# Patient Record
Sex: FEMALE | Race: OTHER | Employment: PART TIME | URBAN - METROPOLITAN AREA
[De-identification: names, ages, dates, MRNs, and addresses within clinical notes are randomized per-mention and may not be internally consistent; named-entity substitution may affect disease eponyms.]

---

## 2017-02-01 ENCOUNTER — APPOINTMENT (OUTPATIENT)
Dept: GENERAL RADIOLOGY | Age: 37
End: 2017-02-01
Attending: NURSE PRACTITIONER

## 2017-02-01 ENCOUNTER — HOSPITAL ENCOUNTER (EMERGENCY)
Age: 37
Discharge: HOME | End: 2017-02-01
Attending: FAMILY MEDICINE

## 2017-02-01 VITALS
RESPIRATION RATE: 16 BRPM | DIASTOLIC BLOOD PRESSURE: 68 MMHG | HEIGHT: 60 IN | OXYGEN SATURATION: 98 % | BODY MASS INDEX: 37.63 KG/M2 | TEMPERATURE: 99 F | WEIGHT: 191.7 LBS | HEART RATE: 99 BPM | SYSTOLIC BLOOD PRESSURE: 103 MMHG

## 2017-02-01 DIAGNOSIS — S39.012A LUMBAR STRAIN, INITIAL ENCOUNTER: Primary | ICD-10-CM

## 2017-02-01 DIAGNOSIS — R07.81 RIB PAIN ON RIGHT SIDE: ICD-10-CM
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
## 2017-02-01 LAB
BILIRUB UR QL: NEGATIVE
GLUCOSE UR QL STRIP.AUTO: NEGATIVE MG/DL
KETONES UR-MCNC: NEGATIVE MG/DL
LEUKOCYTE ESTERASE UR QL STRIP: NEGATIVE
NITRITE UR QL: NEGATIVE
PH UR: 6 [PH] (ref 5–8)
PROT UR QL: NEGATIVE MG/DL
RBC # UR STRIP: ABNORMAL /UL
SP GR UR: 1.01 (ref 1–1.03)
UROBILINOGEN UR QL: 0.2 EU/DL (ref 0.2–1)

## 2017-02-01 RX ORDER — DICLOFENAC POTASSIUM 50 MG/1
50 TABLET, FILM COATED ORAL 3 TIMES DAILY
Qty: 50 TAB | Refills: 0 | Status: SHIPPED | OUTPATIENT
Start: 2017-02-01 | End: 2017-04-18

## 2017-02-01 RX ORDER — METHOCARBAMOL 500 MG/1
500 TABLET, FILM COATED ORAL 3 TIMES DAILY
Qty: 20 TAB | Refills: 0 | Status: SHIPPED | OUTPATIENT
Start: 2017-02-01 | End: 2017-04-18

## 2017-02-01 RX ORDER — KETOROLAC TROMETHAMINE 30 MG/ML
60 INJECTION, SOLUTION INTRAMUSCULAR; INTRAVENOUS
Status: COMPLETED | OUTPATIENT
Start: 2017-02-01 | End: 2017-02-01

## 2017-02-01 RX ADMIN — KETOROLAC TROMETHAMINE 60 MG: 30 INJECTION, SOLUTION INTRAMUSCULAR; INTRAVENOUS at 20:05

## 2017-02-02 NOTE — DISCHARGE INSTRUCTIONS
Preventing Falls: Care Instructions  Your Care Instructions  Getting around your home safely can be a challenge if you have injuries or health problems that make it easy for you to fall. Loose rugs and furniture in walkways are among the dangers for many older people who have problems walking or who have poor eyesight. People who have conditions such as arthritis, osteoporosis, or dementia also have to be careful not to fall. You can make your home safer with a few simple measures. Follow-up care is a key part of your treatment and safety. Be sure to make and go to all appointments, and call your doctor if you are having problems. It's also a good idea to know your test results and keep a list of the medicines you take. How can you care for yourself at home? Taking care of yourself  · You may get dizzy if you do not drink enough water. To prevent dehydration, drink plenty of fluids, enough so that your urine is light yellow or clear like water. Choose water and other caffeine-free clear liquids. If you have kidney, heart, or liver disease and have to limit fluids, talk with your doctor before you increase the amount of fluids you drink. · Exercise regularly to improve your strength, muscle tone, and balance. Walk if you can. Swimming may be a good choice if you cannot walk easily. · Have your vision and hearing checked each year or any time you notice a change. If you have trouble seeing and hearing, you might not be able to avoid objects and could lose your balance. · Know the side effects of the medicines you take. Ask your doctor or pharmacist whether the medicines you take can affect your balance. Sleeping pills or sedatives can affect your balance. · Limit the amount of alcohol you drink. Alcohol can impair your balance and other senses. · Ask your doctor whether calluses or corns on your feet need to be removed.  If you wear loose-fitting shoes because of calluses or corns, you can lose your balance and fall. · Talk to your doctor if you have numbness in your feet. Preventing falls at home  · Remove raised doorway thresholds, throw rugs, and clutter. Repair loose carpet or raised areas in the floor. · Move furniture and electrical cords to keep them out of walking paths. · Use nonskid floor wax, and wipe up spills right away, especially on ceramic tile floors. · If you use a walker or cane, put rubber tips on it. If you use crutches, clean the bottoms of them regularly with an abrasive pad, such as steel wool. · Keep your house well lit, especially [employer], and outside walkways. Use night-lights in areas such as hallways and bathrooms. Add extra light switches or use remote switches (such as switches that go on or off when you clap your hands) to make it easier to turn lights on if you have to get up during the night. · Install sturdy handrails on stairways. · Move items in your cabinets so that the things you use a lot are on the lower shelves (about waist level). · Keep a cordless phone and a flashlight with new batteries by your bed. If possible, put a phone in each of the main rooms of your house, or carry a cell phone in case you fall and cannot reach a phone. Or, you can wear a device around your neck or wrist. You push a button that sends a signal for help. · Wear low-heeled shoes that fit well and give your feet good support. Use footwear with nonskid soles. Check the heels and soles of your shoes for wear. Repair or replace worn heels or soles. · Do not wear socks without shoes on wood floors. · Walk on the grass when the sidewalks are slippery. If you live in an area that gets snow and ice in the winter, sprinkle salt on slippery steps and sidewalks. Preventing falls in the bath  · Install grab bars and nonskid mats inside and outside your shower or tub and near the toilet and sinks. · Use shower chairs and bath benches.   · Use a hand-held shower head that will allow you to sit while showering. · Get into a tub or shower by putting the weaker leg in first. Get out of a tub or shower with your strong side first.  · Repair loose toilet seats and consider installing a raised toilet seat to make getting on and off the toilet easier. · Keep your bathroom door unlocked while you are in the shower. Where can you learn more? Go to http://mansoor-eugene.info/. Enter 0476 79 69 [id number] in the search box to learn more about \"Preventing Falls: Care Instructions. \"  Current as of: August 4, 2016  Content Version: 11.1  © 2999-4627 [employer]. Care instructions adapted under license by [employer] (which disclaims liability or warranty for this information). If you have questions about a medical condition or this instruction, always ask your healthcare professional. [place] [address] any warranty or liability for your use of this information. Back Pain: Care Instructions  Your Care Instructions    Back pain has many possible causes. It is often related to problems with muscles and ligaments of the back. It may also be related to problems with the nerves, discs, or bones of the back. Moving, lifting, standing, sitting, or sleeping in an awkward way can strain the back. Sometimes you don't notice the injury until later. Arthritis is another common cause of back pain. Although it may hurt a lot, back pain usually improves on its own within several weeks. Most people recover in 12 weeks or less. Using good home treatment and being careful not to stress your back can help you feel better sooner. Follow-up care is a key part of your treatment and safety. Be sure to make and go to all appointments, and call your doctor if you are having problems. Its also a good idea to know your test results and keep a list of the medicines you take. How can you care for yourself at home?   · Sit or lie in positions that are most comfortable and reduce your pain. Try one of these positions when you lie down:  ¨ Lie on your back with your knees bent and supported by large pillows. ¨ Lie on the floor with your legs on the seat of a sofa or chair. Deng Maidens on your side with your knees and hips bent and a pillow between your legs. ¨ Lie on your stomach if it does not make pain worse. · Do not sit up in bed, and avoid soft couches and twisted positions. Bed rest can help relieve pain at first, but it delays healing. Avoid bed rest after the first day of back pain. · Change positions every 30 minutes. If you must sit for long periods of time, take breaks from sitting. Get up and walk around, or lie in a comfortable position. · Try using a heating pad on a low or medium setting for 15 to 20 minutes every 2 or 3 hours. Try a warm shower in place of one session with the heating pad. · You can also try an ice pack for 10 to 15 minutes every 2 to 3 hours. Put a thin cloth between the ice pack and your skin. · Take pain medicines exactly as directed. ¨ If the doctor gave you a prescription medicine for pain, take it as prescribed. ¨ If you are not taking a prescription pain medicine, ask your doctor if you can take an over-the-counter medicine. · Take short walks several times a day. You can start with 5 to 10 minutes, 3 or 4 times a day, and work up to longer walks. Walk on level surfaces and avoid hills and stairs until your back is better. · Return to work and other activities as soon as you can. Continued rest without activity is usually not good for your back. · To prevent future back pain, do exercises to stretch and strengthen your back and stomach. Learn how to use good posture, safe lifting techniques, and proper body mechanics. When should you call for help? Call your doctor now or seek immediate medical care if:  · You have new or worsening numbness in your legs. · You have new or worsening weakness in your legs.  (This could make it hard to stand up. )  · You lose control of your bladder or bowels. Watch closely for changes in your health, and be sure to contact your doctor if:  · Your pain gets worse. · You are not getting better after 2 weeks. Where can you learn more? Go to http://mansoor-eugene.info/. Enter N809 in the search box to learn more about \"Back Pain: Care Instructions. \"  Current as of: May 23, 2016  Content Version: 11.1  © 5499-8773 [employer]. Care instructions adapted under license by [employer] (which disclaims liability or warranty for this information). If you have questions about a medical condition or this instruction, always ask your healthcare professional. [address] any warranty or liability for your use of this information.

## 2017-02-02 NOTE — UC PROVIDER NOTE
HPI Comments: 41 yo female presents today with C/O low back and right rib pain after sustaining a fall 2 days ago on stairs. Patient is ambulatory with slow gait. Patient is a 40 y.o. female presenting with rib pain. The history is provided by the patient. No  was used. Rib Pain   This is a new problem. The current episode started 2 days ago. Pertinent negatives include no fever, no headaches, no syncope, no abdominal pain, no leg pain and no leg swelling. Past Medical History   Diagnosis Date    Chronic pain      low back    Uterine fibroid         Past Surgical History   Procedure Laterality Date    Hx other surgical       WISDOM TEETH EXTRACTION    Hx tubal ligation      Hx  section      Pr myomectomy 1-4,w/tot 250gms/<,abd apprc  2014     fibroid removal by Dr. Angela Valerio    Hx  section           Family History   Problem Relation Age of Onset    Diabetes Mother     Asthma Mother     Elevated Lipids Mother     Liver Disease Father      CHIRROSIS    Alcohol abuse Father     No Known Problems Son     No Known Problems Son     Other Sister      PEPTIC ULCER    Diabetes Maternal Aunt     Diabetes Maternal Grandmother     Other Other      NO ANESTHESIA PROBLEMS IN FAMILY    Anesth Problems Neg Hx         Social History     Social History    Marital status:      Spouse name: N/A    Number of children: N/A    Years of education: N/A     Occupational History    Not on file.      Social History Main Topics    Smoking status: Current Every Day Smoker     Packs/day: 0.25     Years: 15.00     Types: Cigarettes    Smokeless tobacco: Current User      Comment: GIVEN \"STOP SMOKING\" HANDOUT.    Alcohol use 0.0 oz/week     0 Standard drinks or equivalent per week      Comment: OCCASIONAL    Drug use: No    Sexual activity: Yes     Partners: Male     Birth control/ protection: Surgical      Comment:  has 2 young children     Other Topics Concern    Not on file     Social History Narrative                ALLERGIES: Pcn [penicillins]; Pork/porcine containing products; and Shellfish containing products    Review of Systems   Constitutional: Negative. Negative for fever. HENT: Negative. Eyes: Negative. Respiratory: Negative. Cardiovascular: Negative. Negative for leg swelling and syncope. Gastrointestinal: Negative for abdominal pain. Endocrine: Negative. Genitourinary: Negative. Musculoskeletal: Positive for back pain. Skin: Negative. Allergic/Immunologic: Negative. Neurological: Negative for headaches. Hematological: Negative. Psychiatric/Behavioral: Negative. Vitals:    02/01/17 1851   BP: 103/68   Pulse: 99   Resp: 16   Temp: 99 °F (37.2 °C)   SpO2: 98%   Weight: 87 kg (191 lb 11.2 oz)   Height: 5' (1.524 m)       Physical Exam   Constitutional: She is oriented to person, place, and time. She appears well-developed and well-nourished. HENT:   Head: Normocephalic and atraumatic. Right Ear: External ear normal.   Left Ear: External ear normal.   Nose: Nose normal.   Mouth/Throat: Oropharynx is clear and moist.   Eyes: Conjunctivae and EOM are normal. Pupils are equal, round, and reactive to light. Neck: Normal range of motion. Neck supple. Cardiovascular: Normal rate, regular rhythm and normal heart sounds. Pulmonary/Chest: Effort normal and breath sounds normal. No respiratory distress. She exhibits no tenderness. Abdominal: Soft. Musculoskeletal: Normal range of motion. + paraspinal lumbar tenderness  No contusions or deformities  ROM is difficult at times     Neurological: She is alert and oriented to person, place, and time. Skin: Skin is warm and dry. Psychiatric: She has a normal mood and affect. Her behavior is normal. Judgment and thought content normal.   Nursing note and vitals reviewed.       MDM     Differential Diagnosis; Clinical Impression; Plan:     CLINICAL IMPRESSION:  Lumbar strain, initial encounter  (primary encounter diagnosis)  Rib pain on right side  Fall, initial encounter    Plan:  1. There are no fractures noted on xray today  2. You are going to be sore  3. Follow up with your PCP as needed  Amount and/or Complexity of Data Reviewed:   Clinical lab tests:  Ordered and reviewed  Tests in the radiology section of CPT®:  Ordered and reviewed  Risk of Significant Complications, Morbidity, and/or Mortality:   Presenting problems: Moderate  Diagnostic procedures:   Moderate  Progress:   Patient progress:  Stable      Procedures

## 2017-03-02 ENCOUNTER — OFFICE VISIT (OUTPATIENT)
Dept: INTERNAL MEDICINE CLINIC | Age: 37
End: 2017-03-02

## 2017-03-02 VITALS
DIASTOLIC BLOOD PRESSURE: 66 MMHG | OXYGEN SATURATION: 98 % | HEIGHT: 60 IN | WEIGHT: 185 LBS | SYSTOLIC BLOOD PRESSURE: 105 MMHG | TEMPERATURE: 98.5 F | RESPIRATION RATE: 18 BRPM | HEART RATE: 92 BPM | BODY MASS INDEX: 36.32 KG/M2

## 2017-03-02 DIAGNOSIS — R35.0 URINE FREQUENCY: ICD-10-CM

## 2017-03-02 DIAGNOSIS — R10.9 RIGHT FLANK PAIN: Primary | ICD-10-CM

## 2017-03-02 LAB
BILIRUB UR QL STRIP: NEGATIVE
GLUCOSE UR-MCNC: NEGATIVE MG/DL
KETONES P FAST UR STRIP-MCNC: NEGATIVE MG/DL
PH UR STRIP: 6 [PH] (ref 4.6–8)
PROT UR QL STRIP: ABNORMAL MG/DL
SP GR UR STRIP: 1.2 (ref 1–1.03)
UA UROBILINOGEN AMB POC: ABNORMAL (ref 0.2–1)
URINALYSIS CLARITY POC: CLEAR
URINALYSIS COLOR POC: YELLOW
URINE BLOOD POC: NEGATIVE
URINE LEUKOCYTES POC: NEGATIVE
URINE NITRITES POC: NEGATIVE

## 2017-03-02 RX ORDER — PHENAZOPYRIDINE HYDROCHLORIDE 100 MG/1
100 TABLET, FILM COATED ORAL
Qty: 9 TAB | Refills: 0 | Status: SHIPPED | OUTPATIENT
Start: 2017-03-02 | End: 2017-03-05

## 2017-03-02 RX ORDER — ZOLPIDEM TARTRATE 5 MG/1
TABLET ORAL
Refills: 5 | COMMUNITY
Start: 2017-02-18 | End: 2017-04-18

## 2017-03-02 RX ORDER — IBUPROFEN 800 MG/1
TABLET ORAL
Refills: 5 | COMMUNITY
Start: 2017-01-05 | End: 2017-04-18

## 2017-03-02 NOTE — PROGRESS NOTES
Bethany Tracy is a 40 y.o. female who presents for evaluation of urine frequency and right flank pain. Injured self early in [de-identified], went to  on , had normal xray with no fx.  ua had small blood. Still with some right flank pain and some frequency. ROS:  Constitutional: negative for fevers, chills, anorexia and weight loss  Eyes:   negative for visual disturbance and irritation  ENT:   negative for tinnitus,sore throat,nasal congestion,ear pain,hoarseness  Respiratory:  negative for cough, hemoptysis, dyspnea,wheezing  CV:   negative for chest pain, palpitations, lower extremity edema  GI:   negative for nausea, vomiting, diarrhea, abdominal pain,melena  Genitourinary: negative for dysuria and hematuria. ++frequency  Musculoskel: negative for myalgias, arthralgias, back pain, muscle weakness, joint pain  Neurological:  negative for headaches, dizziness, focal weakness, numbness  Psychiatric:     Negative for depression or anxiety      Past Medical History:   Diagnosis Date    Chronic pain     low back    Uterine fibroid        Past Surgical History:   Procedure Laterality Date    HX  SECTION      HX  SECTION      HX OTHER SURGICAL      WISDOM TEETH EXTRACTION    HX TUBAL LIGATION      MYOMECTOMY 1-4,W/TOT 250GMS/<, [address]  2014    fibroid removal by Dr. Marbella Desouza       Family History   Problem Relation Age of Onset    Diabetes Mother     Asthma Mother     Elevated Lipids Mother     Liver Disease Father      CHIRROSIS    Alcohol abuse Father     No Known Problems Son     No Known Problems Son     Other Sister      PEPTIC ULCER    Diabetes Maternal Aunt     Diabetes Maternal Grandmother     Other Other      NO ANESTHESIA PROBLEMS IN FAMILY    Anesth Problems Neg Hx        Social History     Social History    Marital status:      Spouse name: N/A    Number of children: N/A    Years of education: N/A     Occupational History    Not on file. Social History Main Topics    Smoking status: Former Smoker     Packs/day: 0.25     Years: 15.00     Types: Cigarettes     Quit date: 1/3/2017    Smokeless tobacco: Current User      Comment: GIVEN \"STOP SMOKING\" HANDOUT.    Alcohol use No    Drug use: No    Sexual activity: Yes     Partners: Male     Birth control/ protection: Surgical      Comment:  has 2 young children     Other Topics Concern    Not on file     Social History Narrative            Visit Vitals    /66 (BP 1 Location: Right arm, BP Patient Position: Sitting)    Pulse 92    Temp 98.5 °F (36.9 °C) (Oral)    Resp 18    Ht 5' (1.524 m)    Wt 185 lb (83.9 kg)    LMP 08/08/2016    SpO2 98%    BMI 36.13 kg/m2       Physical Examination:   General - Well appearing female  HEENT - PERRL, TM no erythema/opacification, normal nasal turbinates, no oropharyngeal erythema or exudate, MMM  Neck - supple, no bruits, no thyroidomegaly, no lymphadenopathy  Pulm - clear to auscultation bilaterally  Cardio - RRR, normal S1 S2, no murmur  Abd - soft, nontender, no masses, no HSM  Extrem - no edema, +2 distal pulses  Neuro-  No focal deficits, CN intact     Assessment/Plan:    1. Urine frequency--ua clear. rx given for pyridium  2. Right flank pain--supportive care, add ice, nsaids bid with food prn. Xray was normal, as is exam.    rtc prn.   Sees dr Goyo Mcallister,

## 2017-03-02 NOTE — MR AVS SNAPSHOT
Visit Information Date & Time Provider Department Dept. Phone Encounter #  
 3/2/2017 11:45 AM Mitch Ibarra, [address] [id number] Follow-up Instructions Return if symptoms worsen or fail to improve. Upcoming Health Maintenance Date Due  
 PAP AKA CERVICAL CYTOLOGY 1/18/2001 DTaP/Tdap/Td series (2 - Td) 3/2/2027 Allergies as of 3/2/2017  Review Complete On: 3/2/2017 By: Mehdi Yates III, DO Severity Noted Reaction Type Reactions Pcn [Penicillins]  06/24/2012    Rash Pork/porcine Containing Products  01/20/2015    Rash Shellfish Containing Products  01/20/2015    Rash Current Immunizations  Never Reviewed No immunizations on file. Not reviewed this visit You Were Diagnosed With   
  
 Codes Comments Right flank pain    -  Primary ICD-10-CM: R10.9 ICD-9-CM: 789.09 Urine frequency     ICD-10-CM: R35.0 ICD-9-CM: 788.41 Vitals BP  
  
  
  
  
  
 105/66 (BP 1 Location: Right arm, BP Patient Position: Sitting) Vitals History BMI and BSA Data Body Mass Index Body Surface Area  
 36.13 kg/m 2 1.88 m 2 Preferred Pharmacy Pharmacy Name Phone [id number] [address] [phone] Your Updated Medication List  
  
   
This list is accurate as of: 3/2/17 12:48 PM.  Always use your most recent med list.  
  
  
  
  
 diclofenac potassium 50 mg tablet Commonly known as:  CATAFLAM  
Take 1 Tab by mouth three (3) times daily. Indications: Pain * ibuprofen 600 mg tablet Commonly known as:  MOTRIN Take 1 Tab by mouth every six (6) hours as needed. * ibuprofen 800 mg tablet Commonly known as:  MOTRIN  
TAKE 1 TABLET BY MOUTH 3 TIMES A DAY  
  
 methocarbamol 500 mg tablet Commonly known as:  ROBAXIN Take 1 Tab by mouth three (3) times daily. phenazopyridine 100 mg tablet Commonly known as:  PYRIDIUM Take 1 Tab by mouth three (3) times daily as needed for Pain for up to 3 days. zolpidem 5 mg tablet Commonly known as:  AMBIEN  
TAKE 1 TABLET BY MOUTH EVERY NIGHT AT BEDTIME AS NEEDED * Notice: This list has 2 medication(s) that are the same as other medications prescribed for you. Read the directions carefully, and ask your doctor or other care provider to review them with you. Prescriptions Sent to Pharmacy Refills  
 phenazopyridine (PYRIDIUM) 100 mg tablet 0 Sig: Take 1 Tab by mouth three (3) times daily as needed for Pain for up to 3 days. Class: Normal  
 Pharmacy: [facility] #[id number] #: [phone] Route: Oral  
  
Follow-up Instructions Return if symptoms worsen or fail to improve. Patient Instructions Frequent Urination: Care Instructions Your Care Instructions An urge to urinate frequently but usually passing only small amounts of urine is a common symptom of urinary problems, such as urinary tract infections. The bladder may become inflamed. This can cause the urge to urinate. You may try to urinate more often than usual to try to soothe that urge. Frequent urination also may be caused by sexually transmitted infections (STIs) or kidney stones. Or it may happen when something irritates the tube that carries urine from the bladder to the outside of the body (urethra). It may also be a sign of diabetes. The cause may be hard to find. You may need tests. Follow-up care is a key part of your treatment and safety. Be sure to make and go to all appointments, and call your doctor if you are having problems. It's also a good idea to know your test results and keep a list of the medicines you take. How can you care for yourself at home?  
· Drink extra water and juices such as cranberry and blueberry juices for the next day or two. This will help make the urine less concentrated. And it may help wash out any bacteria that may be causing an infection. (If you have kidney, heart, or liver disease and have to limit fluids, talk with your doctor before you increase the amount of fluids you drink.) · Avoid drinks that are carbonated or have caffeine. They can irritate the bladder. For women: · Urinate right after you have sex. · After you go to the bathroom, wipe from front to back. · Avoid douches, bubble baths, and feminine hygiene sprays. And avoid other feminine hygiene products that have deodorants. When should you call for help? Call your doctor now or seek immediate medical care if: 
· You have new symptoms, such as fever, nausea, or vomiting. · You have new or worse symptoms of a urinary problem. For example: ¨ You have blood or pus in your urine. ¨ You have chills or body aches. ¨ It hurts to urinate. ¨ You have groin or belly pain. ¨ You have pain in your back just below your rib cage (the flank area). Watch closely for changes in your health, and be sure to contact your doctor if you feel thirstier than usual. 
Where can you learn more? Go to http://mansoor-eugene.info/. Enter 101 [id number] in the search box to learn more about \"Frequent Urination: Care Instructions. \" Current as of: August 12, 2016 Content Version: 11.1 © 2347-2413 [employer]. Care instructions adapted under license by "[employer] (which disclaims liability or warranty for this information). If you have questions about a medical condition or this instruction, always ask your healthcare professional. [address] any warranty or liability for your use of this information. Introducing [facility]! Dear Felicia Cons: 
Thank you for requesting a TxtFeedback account. Our records indicate that you already have an active TxtFeedback account.   You can access your account anytime at [email or web address] Did you know that you can access your hospital and ER discharge instructions at any time in Swoodoo? You can also review all of your test results from your hospital stay or ER visit. Additional Information If you have questions, please visit the Frequently Asked Questions section of the Swoodoo website at [email or web address] Remember, Swoodoo is NOT to be used for urgent needs. For medical emergencies, dial 911. Now available from your iPhone and Android! Please provide this summary of care documentation to your next provider. Your primary care clinician is listed as Jeremias Noble. If you have any questions after today's visit, please call [phone].

## 2017-03-02 NOTE — PROGRESS NOTES
Reviewed record in preparation for visit and have obtained necessary documentation. Identified pt with two pt identifiers(name and ). Chief Complaint   Patient presents with    Flank Pain     right       Health Maintenance Due   Topic Date Due    Pneumococcal 19-64 Medium Risk (1 of 1 - PPSV23) 1999    DTaP/Tdap/Td series (1 - Tdap) 2001    PAP AKA CERVICAL CYTOLOGY  2001    INFLUENZA AGE 9 TO ADULT  2016       Ms. Jem Gill has a reminder for a \"due or due soon\" health maintenance. I have asked that she discuss health maintenance topic(s) due with Her  primary care provider. Coordination of Care Questionnaire:  :     1) Have you been to an emergency room, urgent care clinic since your last visit? yes   Hospitalized since your last visit? no             2) Have you seen or consulted any other health care providers outside of [address] [place] [address] since your last visit? no  (Include any pap smears or colon screenings in this section.)      Patient is accompanied by self I have received verbal consent from Bethany Tracy to discuss any/all medical information while they are present in the room.

## 2017-03-02 NOTE — PATIENT INSTRUCTIONS
Frequent Urination: Care Instructions  Your Care Instructions  An urge to urinate frequently but usually passing only small amounts of urine is a common symptom of urinary problems, such as urinary tract infections. The bladder may become inflamed. This can cause the urge to urinate. You may try to urinate more often than usual to try to soothe that urge. Frequent urination also may be caused by sexually transmitted infections (STIs) or kidney stones. Or it may happen when something irritates the tube that carries urine from the bladder to the outside of the body (urethra). It may also be a sign of diabetes. The cause may be hard to find. You may need tests. Follow-up care is a key part of your treatment and safety. Be sure to make and go to all appointments, and call your doctor if you are having problems. It's also a good idea to know your test results and keep a list of the medicines you take. How can you care for yourself at home? · Drink extra water and juices such as cranberry and blueberry juices for the next day or two. This will help make the urine less concentrated. And it may help wash out any bacteria that may be causing an infection. (If you have kidney, heart, or liver disease and have to limit fluids, talk with your doctor before you increase the amount of fluids you drink.)  · Avoid drinks that are carbonated or have caffeine. They can irritate the bladder. For women:  · Urinate right after you have sex. · After you go to the bathroom, wipe from front to back. · Avoid douches, bubble baths, and feminine hygiene sprays. And avoid other feminine hygiene products that have deodorants. When should you call for help? Call your doctor now or seek immediate medical care if:  · You have new symptoms, such as fever, nausea, or vomiting. · You have new or worse symptoms of a urinary problem. For example:  ¨ You have blood or pus in your urine. ¨ You have chills or body aches.   ¨ It hurts to urinate. ¨ You have groin or belly pain. ¨ You have pain in your back just below your rib cage (the flank area). Watch closely for changes in your health, and be sure to contact your doctor if you feel thirstier than usual.  Where can you learn more? Go to http://mansoor-eugene.info/. Enter 644 [id number] in the search box to learn more about \"Frequent Urination: Care Instructions. \"  Current as of: August 12, 2016  Content Version: 11.1  © 9087-8797 [employer]. Care instructions adapted under license by [employer] (which disclaims liability or warranty for this information). If you have questions about a medical condition or this instruction, always ask your healthcare professional. [address] any warranty or liability for your use of this information.

## 2017-03-06 ENCOUNTER — TELEPHONE (OUTPATIENT)
Dept: INTERNAL MEDICINE CLINIC | Age: 37
End: 2017-03-06

## 2017-03-06 RX ORDER — TRAMADOL HYDROCHLORIDE 50 MG/1
50 TABLET ORAL
Qty: 20 TAB | Refills: 0 | Status: SHIPPED | OUTPATIENT
Start: 2017-03-06 | End: 2017-04-18

## 2017-03-06 NOTE — TELEPHONE ENCOUNTER
Dr. Cole Carlson patient last seen by Dr. Tere Hernandez on 3/2/17 states she needs a call back in reference to getting pain medication discussed at [facility] that she declined at [facility] but patient states she's still in pain. Please call to discuss.  Thank you

## 2017-03-06 NOTE — TELEPHONE ENCOUNTER
I called and spoke with patient. Pt states that Dr Minerva Meza offered to give Tramadol at her appointment but she declined because it makes her tired. She states that she has been taking Aleve which is not helping. Pt is requesting Tramadol.

## 2017-04-18 ENCOUNTER — OFFICE VISIT (OUTPATIENT)
Dept: INTERNAL MEDICINE CLINIC | Age: 37
End: 2017-04-18

## 2017-04-18 VITALS
BODY MASS INDEX: 36.52 KG/M2 | OXYGEN SATURATION: 96 % | SYSTOLIC BLOOD PRESSURE: 94 MMHG | DIASTOLIC BLOOD PRESSURE: 69 MMHG | HEIGHT: 60 IN | HEART RATE: 96 BPM | RESPIRATION RATE: 16 BRPM | TEMPERATURE: 98 F | WEIGHT: 186 LBS

## 2017-04-18 DIAGNOSIS — F41.9 ANXIETY: Primary | ICD-10-CM

## 2017-04-18 DIAGNOSIS — G89.29 CHRONIC LOW BACK PAIN WITHOUT SCIATICA, UNSPECIFIED BACK PAIN LATERALITY: ICD-10-CM

## 2017-04-18 DIAGNOSIS — M54.50 CHRONIC LOW BACK PAIN WITHOUT SCIATICA, UNSPECIFIED BACK PAIN LATERALITY: ICD-10-CM

## 2017-04-18 RX ORDER — TRAMADOL HYDROCHLORIDE 50 MG/1
50 TABLET ORAL
Qty: 30 TAB | Refills: 1 | Status: SHIPPED | OUTPATIENT
Start: 2017-04-18 | End: 2017-04-28

## 2017-04-18 NOTE — PROGRESS NOTES
Reviewed record in preparation for visit and have obtained necessary documentation. Identified pt with two pt identifiers(name and ). Chief Complaint   Patient presents with    Depression     pt is here for a f/u meds taken pt states works for her but  she is unable to sleep    Back Pain     pt still having back pain (Tramadol works better per pt)    Medication Refill     pt will like another script for Ambien and Tramadol        Health Maintenance Due   Topic Date Due    PAP AKA CERVICAL CYTOLOGY  2001       Coordination of Care Questionnaire:  :     1. Have you been to the ER, urgent care clinic since your last visit? Hospitalized since your last visit?no    2. Have you seen or consulted any other health care providers outside of the [facility] since your last visit? Include any pap smears or colon screening.  no

## 2017-04-18 NOTE — MR AVS SNAPSHOT
Visit Information Date & Time Provider Department Dept. Phone Encounter #  
 4/18/2017 11:30 AM Frankey Level, [address] [id number] Follow-up Instructions Return if symptoms worsen or fail to improve. Upcoming Health Maintenance Date Due  
 PAP AKA CERVICAL CYTOLOGY 1/18/2001 DTaP/Tdap/Td series (2 - Td) 3/2/2027 Allergies as of 4/18/2017  Review Complete On: 4/18/2017 By: Frankey Level, MD  
  
 Severity Noted Reaction Type Reactions Pcn [Penicillins]  06/24/2012    Rash Pork/porcine Containing Products  01/20/2015    Rash Shellfish Containing Products  01/20/2015    Rash Current Immunizations  Never Reviewed No immunizations on file. Not reviewed this visit You Were Diagnosed With   
  
 Codes Comments Anxiety    -  Primary ICD-10-CM: F41.9 ICD-9-CM: 300.00 Chronic low back pain without sciatica, unspecified back pain laterality     ICD-10-CM: M54.5, G89.29 ICD-9-CM: 724.2, 338.29 Vitals BP Pulse Temp Resp Height(growth percentile) Weight(growth percentile) 94/69 96 98 °F (36.7 °C) (Oral) 16 5' (1.524 m) 186 lb (84.4 kg) LMP SpO2 BMI OB Status Smoking Status 08/08/2016 96% 36.33 kg/m2 Hysterectomy Former Smoker BMI and BSA Data Body Mass Index Body Surface Area  
 36.33 kg/m 2 1.89 m 2 Preferred Pharmacy Pharmacy Name Phone [id number], [address] [place] [address] [phone] Your Updated Medication List  
  
   
This list is accurate as of: 4/18/17 12:40 PM.  Always use your most recent med list.  
  
  
  
  
 traMADol 50 mg tablet Commonly known as:  ULTRAM  
Take 1 Tab by mouth every eight (8) hours as needed for Pain for up to 10 days. Max Daily Amount: 150 mg.  
  
  
  
  
Prescriptions Printed  Refills  
 traMADol (ULTRAM) 50 mg tablet 1  
 Sig: Take 1 Tab by mouth every eight (8) hours as needed for Pain for up to 10 days. Max Daily Amount: 150 mg.  
 Class: Print Route: Oral  
  
Follow-up Instructions Return if symptoms worsen or fail to improve. Introducing [facility]! Dear Hussain Dennis: 
Thank you for requesting a Herzio account. Our records indicate that you already have an active Herzio account. You can access your account anytime at [email or web address] Did you know that you can access your hospital and ER discharge instructions at any time in Herzio? You can also review all of your test results from your hospital stay or ER visit. Additional Information If you have questions, please visit the Frequently Asked Questions section of the Herzio website at [email or web address] Remember, Herzio is NOT to be used for urgent needs. For medical emergencies, dial 911. Now available from your iPhone and Android! Please provide this summary of care documentation to your next provider. Your primary care clinician is listed as Cooc Bernal. If you have any questions after today's visit, please call [phone].

## 2017-04-18 NOTE — PROGRESS NOTES
SUBJECTIVE:   Ms. Christa Alston is a 40 y.o. female who is here c/o depression. Pt is asking about treatment to lose weight. Pt reports eating healthier and drinking a gallon of water per day. Pt states she is no longer exercising. Pt reports her clothes are fitting differently and she has dropped one dress size. Pt states Buspar is offering some relief of depression. Pt notes Buspar originally caused increased agitation. Pt c/o insomnia since starting Buspar. Pt states she is agitated at night. Pt denies taking Melatonin. Pt states she would like to continue taking Tramadol for back pain. Pt's BP is normal at 94/69 today. At this time, she is otherwise doing well and has brought no other complaints to my attention today. For a list of the medical issues addressed today, see the assessment and plan below. PMH:   Past Medical History:   Diagnosis Date    Chronic pain     low back    Uterine fibroid      PSH:  has a past surgical history that includes other surgical; tubal ligation;  section (); myomectomy 1-4,w/tot 250gms/<,abd apprch (); and  section (). All: is allergic to pcn [penicillins]; pork/porcine containing products; and shellfish containing products. MEDS:   Current Outpatient Prescriptions   Medication Sig    traMADol (ULTRAM) 50 mg tablet Take 1 Tab by mouth every eight (8) hours as needed for Pain for up to 10 days. Max Daily Amount: 150 mg. No current facility-administered medications for this visit. FH: family history includes Alcohol abuse in her father; Asthma in her mother; Diabetes in her maternal aunt, maternal grandmother, and mother; Elevated Lipids in her mother; Liver Disease in her father; No Known Problems in her son and son; Other in her sister and another family member. There is no history of Anesth Problems. SH:  reports that she quit smoking about 3 months ago. Her smoking use included Cigarettes.  She has a 3.75 pack-year smoking history. She uses smokeless tobacco. She reports that she does not drink alcohol or use illicit drugs. Review of Systems - History obtained from the patient  General ROS: +insomnia, otherwise negative  Psychological ROS: negative  Ophthalmic ROS: negative  ENT ROS: negative  Respiratory ROS: no cough, shortness of breath, or wheezing  Cardiovascular ROS: no chest pain or dyspnea on exertion  Gastrointestinal ROS: no abdominal pain, change in bowel habits, or black or bloody stools  Genito-Urinary ROS: negative  Musculoskeletal ROS: negative  Neurological ROS: negative  Dermatological ROS: negative    OBJECTIVE:   Vitals:   Visit Vitals    BP 94/69    Pulse 96    Temp 98 °F (36.7 °C) (Oral)    Resp 16    Ht 5' (1.524 m)    Wt 186 lb (84.4 kg)    LMP 08/08/2016    SpO2 96%    BMI 36.33 kg/m2      Gen: Pleasant 40 y.o.  female in NAD. HEENT: NC/AT. HEART: RRR, No M/G/R. LUNGS: CTAB No W/R. EXTREMITIES: Warm. No C/C/E. NEURO: Alert and oriented x 3. Cranial nerves grossly intact. No focal sensory or motor deficits noted. SKIN: Warm. Dry. No rashes or other lesions noted. ASSESSMENT/ PLAN:   Torsten Townsend was seen today for depression, back pain, medication refill and other. Diagnoses and all orders for this visit:    Anxiety    Chronic low back pain without sciatica, unspecified back pain laterality  -     traMADol (ULTRAM) 50 mg tablet; Take 1 Tab by mouth every eight (8) hours as needed for Pain for up to 10 days. Max Daily Amount: 150 mg. Pt was advised to continue dieting to facilitate weight loss. I advised patient to exercise 3x/week with resistance training. I recommended the pt pay more attention to how her clothes are fitting vs how much weight she is losing. I refilled Tramadol for continued management of pain. Ambien interacts with Tramadol and Buspar interacts with Tramadol. Pt signed pain management contract in the office today.      Pt was advised to try OTC Melatonin for management of insomnia. Pt will f/u if symptoms worsen or fail to improve. Follow-up Disposition:  Return if symptoms worsen or fail to improve. I have reviewed the patient's medications and risks/side effects/benefits were discussed. Diagnosis(-es) explained to patient and questions answered. Literature provided where appropriate.      Written by Rosamaria Montero, as dictated by Krishan Krause MD.

## 2017-04-18 NOTE — LETTER
Tracy Boston [facility]:[birth year] MR #:[id number] Provider Tucker Richards MD  
*PFWK-133* BSMG-491 (5/16) Page 1 of 5 Initial PowerDMS CONTROLLED SUBSTANCE AGREEMENT I may be prescribed medications that are controlled substances as part  of my treatment plan for management of my medical condition(s). The goal of my treatment plan is to maintain and/or improve my health and wellbeing. Because controlled substances have an increased risk of abuse or harm, continual re-evaluation is needed determine if the goals of my treatment plan are being met for my safety and the safety of others. Genesis Lopes  am entering into this Controlled Substance Agreement with my provider, Marbella Rojas MD at [facility] . I understand that successful treatment requires mutual trust and honesty between me and my provider. I understand that there are state and federal laws and regulations which apply to the medications that my provider may prescribe that must be followed. I understand there are risks and benefits ts of taking the medicines that my provider may prescribe. I understand and agree that following this Agreement is necessary in continuing my provider-patient relationship and success of my treatment plan. As a part of my treatment plan, I agree to the following: COMMUNICATION: 
 
1. I will communicate fully with my provider about my medical condition(s), including the effect on my daily life and how well my medications are helping. I will tell my provider all of the medications that I take for any reason, including medications I receive from another health care provider, and will notify my provider about all issues, problems or concerns, including any side effects, which may be related to my medications. I understand that this information allows my provider to adjust my treatment plan to help manage my medical condition.  I understand that this information will become part of my permanent medical record. 2. I will notify my provider if I have a history of alcohol/drug misuse/addiction or if I have had treatment for alcohol/drug addiction in the past, or if I have a new problem with or concern about alcohol/drug use/addiction, because this increases the likelihood of high risk behaviors and may lead to serious medical conditions. 3. Females Only: I will notify my provider if I am or become pregnant, or if I intend to become pregnant, or if I intend to breastfeed. I understand that communication of these issues with my provider is important, due to possible effects my medication could have on an unborn fetus or breastfeeding child. Isabella Caballero JGY:[birth year] MR #:[id number] Provider Rocio Gillespie MD  
*TOUX-692* MG-491 (5/16) Page 2 of 5 Initial SMARTworks MISUSE OF MEDICATIONS / DRUGS: 
 
1. I agree to take all controlled substances as prescribed, and will not misuse or abuse any controlled substances prescribed by my provider. For my safety, I will not increase the amount of medicine I take without first talking with and getting permission from my provider. 2. If I have a medical emergency, another health care provider may prescribe me medication. If I seek emergency treatment, I will notify my provider within seventy-two (72) hours. 3. I understand that my provider may discuss my use and/or possible misuse/abuse of controlled substances and alcohol, as appropriate, with any health care provider involved in my care, pharmacist or legal authority. ILLEGAL DRUGS: 
 
1. I will not use illegal drugs of any kind, including but not limited to marijuana, heroin, cocaine, or any prescription drug which is not prescribed to me. DRUG DIVERSION / PRESCRIPTION FRAUD: 
 
1. I will not share, sell, trade, give away, or otherwise misuse my prescriptions or medications. 2. I will not alter any prescriptions provided to me by my provider. SINGLE PROVIDER: 
 
1. I agree that all controlled substances that I take will be prescribed only by my provider (or his/her covering provider) under this Agreement. This agreement does not prevent me from seeking emergency medical treatment or receiving pain management related to a surgery. PROTECTING MEDICATIONS: 
 
1. I am responsible for keeping my prescriptions and medications in a safe and secure place including safeguarding them from loss or theft. I understand that lost, stolen or damaged/destroyed prescriptions or medications will not be replaced. Tee Riggs YLW:[birth year] MR #:[id number] Provider Reema Mathias MD  
*XEHY-038* BSMG-491 (5/16) Page 3 of 5 Initial Satiety PRESCRIPTION RENEWALS/REFILLS: 
 
1. I will follow my controlled substance medication schedule as prescribed by my provider. 2. I understand and agree that I will make any requests for renewals or refills of my prescriptions only at the time of an office visit or during my providers regular office hours subject to the prescription refill requirements of the individual practice. 3. I understand that my provider may not call in prescriptions for controlled substances to my pharmacy. 4. I understand that my provider may adjust or discontinue these medications as deemed appropriate for my medical treatment plan. This Agreement does not guarantee the prescription of controlled medications. 5. I agree that if my medications are adjusted or discontinued, I will properly dispose of any remaining medications. I understand that I will be required to dispose of any remaining controlled medications prior to being provided with any prescriptions for other controlled medications.  
 
 
1. I authorize my provider and my pharmacy to cooperate fully with any local, state, or federal law enforcement agency in the investigation of any possible misuse, sale, or other diversion of my controlled substance prescriptions or medications. RISKS: 
 
 
1. I understand that if I do not adhere to this Agreement in any way, my provider may change my prescriptions, stop prescribing controlled substances or end our provider-patient relationship. 2. If my provider decides to stop prescribing medication, or decides to end our provider-patient relationship,my provider may require that I taper my medications slowly. If necessary, my provider may also provide a prescription for other medications to treat my withdrawal symptoms. UNDERSTANDING THIS AGREEMENT: 
 
I understand that my provider may adjust or stop my prescriptions for controlled substances based on my medical condition and my treatment plan. I understand that this Agreement does not guarantee that I will be prescribed medications or controlled substances. I understand that controlled substances may be just one part 
of my treatment plan.  
 
My initial on each page and my signature below shows that I have read each page of this Agreement, I have had an opportunity to ask questions, and all of my questions have been answered to my satisfaction by my provider. By signing below, I agree to comply with this Agreement, and I understand that if I do not follow the Agreements listed above, my provider may stop 
 
 
 
_________________________________________  Date/Time 4/18/2017 12:38 PM   
             (Patient Signature)

## 2017-06-04 ENCOUNTER — PATIENT MESSAGE (OUTPATIENT)
Dept: INTERNAL MEDICINE CLINIC | Age: 37
End: 2017-06-04

## 2017-06-07 RX ORDER — BUSPIRONE HYDROCHLORIDE 7.5 MG/1
TABLET ORAL
Qty: 60 TAB | Refills: 1 | Status: SHIPPED | OUTPATIENT
Start: 2017-06-07 | End: 2017-07-10 | Stop reason: SDUPTHER

## 2017-06-07 RX ORDER — BUSPIRONE HYDROCHLORIDE 7.5 MG/1
TABLET ORAL
Refills: 1 | COMMUNITY
Start: 2017-05-30 | End: 2017-06-07 | Stop reason: SDUPTHER

## 2017-07-12 RX ORDER — BUSPIRONE HYDROCHLORIDE 7.5 MG/1
TABLET ORAL
Qty: 60 TAB | Refills: 1 | Status: SHIPPED | OUTPATIENT
Start: 2017-07-12 | End: 2017-09-13 | Stop reason: SDUPTHER

## 2017-09-14 RX ORDER — BUSPIRONE HYDROCHLORIDE 7.5 MG/1
TABLET ORAL
Qty: 60 TAB | Refills: 1 | Status: SHIPPED | OUTPATIENT
Start: 2017-09-14 | End: 2017-11-10 | Stop reason: SDUPTHER

## 2017-09-14 RX ORDER — TRAMADOL HYDROCHLORIDE 50 MG/1
50 TABLET ORAL
OUTPATIENT
Start: 2017-09-14

## 2017-09-27 NOTE — TELEPHONE ENCOUNTER
[facility] #[phone]  abuse dept states pt is getting pain medication from several other physicians. They have spoke to the other offices and no one was aware of this. Use ref #[id number] when calling.

## 2017-09-27 NOTE — TELEPHONE ENCOUNTER
Call completed to [facility], two patient identifiers verified. Patient has been flagged as an ploy-pharmacy user.  pulled and printed for MD review.

## 2017-11-10 ENCOUNTER — TELEPHONE (OUTPATIENT)
Dept: INTERNAL MEDICINE CLINIC | Age: 37
End: 2017-11-10

## 2017-11-10 RX ORDER — BUSPIRONE HYDROCHLORIDE 7.5 MG/1
TABLET ORAL
Qty: 60 TAB | Refills: 1 | Status: CANCELLED | OUTPATIENT
Start: 2017-11-10

## 2017-11-10 RX ORDER — BUSPIRONE HYDROCHLORIDE 7.5 MG/1
TABLET ORAL
Qty: 60 TAB | Refills: 1 | Status: SHIPPED | OUTPATIENT
Start: 2017-11-10 | End: 2017-11-16 | Stop reason: SDUPTHER

## 2017-11-10 NOTE — TELEPHONE ENCOUNTER
Last refill 9/14/17 last office visit 4/23/17. Message was left for patient to schedule an appointment.

## 2017-11-13 ENCOUNTER — OFFICE VISIT (OUTPATIENT)
Dept: INTERNAL MEDICINE CLINIC | Age: 37
End: 2017-11-13

## 2017-11-13 VITALS
OXYGEN SATURATION: 98 % | SYSTOLIC BLOOD PRESSURE: 114 MMHG | HEIGHT: 60 IN | RESPIRATION RATE: 16 BRPM | HEART RATE: 110 BPM | BODY MASS INDEX: 35.93 KG/M2 | WEIGHT: 183 LBS | DIASTOLIC BLOOD PRESSURE: 81 MMHG | TEMPERATURE: 98.2 F

## 2017-11-13 DIAGNOSIS — R63.1 POLYDIPSIA: ICD-10-CM

## 2017-11-13 DIAGNOSIS — R53.83 FATIGUE, UNSPECIFIED TYPE: ICD-10-CM

## 2017-11-13 DIAGNOSIS — G62.9 NEUROPATHY: Primary | ICD-10-CM

## 2017-11-13 DIAGNOSIS — F32.A DEPRESSION, UNSPECIFIED DEPRESSION TYPE: ICD-10-CM

## 2017-11-13 DIAGNOSIS — F41.9 ANXIETY: ICD-10-CM

## 2017-11-13 RX ORDER — SERTRALINE HYDROCHLORIDE 50 MG/1
50 TABLET, FILM COATED ORAL DAILY
Qty: 30 TAB | Refills: 5 | Status: SHIPPED | OUTPATIENT
Start: 2017-11-13 | End: 2017-11-16 | Stop reason: SINTOL

## 2017-11-13 NOTE — PROGRESS NOTES
SUBJECTIVE:   Ms. Felecia Marin is a 40 y.o. female who is here for follow up of routine medical issues. Normally she sees Dr. Gulshan Akbar.     Chief Complaint   Patient presents with    Diabetes     pt here today concerned of dm; pt c.o numbness/tingling in both arms, sugar craving, thrist feeling       She is not known to be a diabetic, but is worried about it. \"My aunt just  of diabetes and it runs in my family. \"  All 10 digits are tingling: \"electric shocks and pin prickly. \"   Fatigue. Thirsty all the time. Urinates a lot: \"I saw a specialist, and I have a collapsed bladder causing that. \"    Depression stable, though \"lately my anxiety is more heightened. \"  \"I feel paranoid sometimes. \"  Buspar is in adequate. At this time, she is otherwise doing well and has brought no other complaints to my attention today. For a list of the medical issues addressed today, see the assessment and plan below. PMH:   Past Medical History:   Diagnosis Date    Chronic pain     low back    Uterine fibroid        Past Surgical History:   Procedure Laterality Date    HX  SECTION      HX  SECTION      HX OTHER SURGICAL      WISDOM TEETH EXTRACTION    HX TUBAL LIGATION      MYOMECTOMY 1-4,W/TOT 250GMS/<, [address]      fibroid removal by Dr. Deena Ray       All: She is allergic to pcn [penicillins]; pork/porcine containing products; and shellfish containing products. Current Outpatient Prescriptions   Medication Sig    busPIRone (BUSPAR) 7.5 mg tablet TAKE 1 TABLET BY MOUTH TWICE A DAY     No current facility-administered medications for this visit. FH: Her family history includes Alcohol abuse in her father; Asthma in her mother; Diabetes in her maternal aunt, maternal grandmother, and mother; Elevated Lipids in her mother; Liver Disease in her father; No Known Problems in her son and son; Other in her sister and another family member. There is no history of Anesth Problems. SH: , and monogamous. She reports that she quit smoking about 10 months ago. Her smoking use included Cigarettes. She has a 3.75 pack-year smoking history. She uses smokeless tobacco. She reports that she does not drink alcohol or use illicit drugs. ROS: See above; Complete ROS otherwise negative. OBJECTIVE:   Vitals:   Visit Vitals    /81 (BP 1 Location: Left arm, BP Patient Position: Sitting)    Pulse (!) 110    Temp 98.2 °F (36.8 °C) (Oral)    Resp 16    Ht 5' (1.524 m)    Wt 183 lb (83 kg)    LMP 08/08/2016    SpO2 98%    BMI 35.74 kg/m2      Gen: Pleasant 40 y.o.  female in NAD. HEENT: PERRLA. EOMI. OP moist and pink. Neck: Supple. No LAD. HEART: RRR, No M/G/R.    LUNGS: CTAB No W/R. ABDOMEN: S, NT, ND, BS+. EXTREMITIES: Warm. No C/C/E.  MUSCULOSKELETAL: Normal ROM, muscle strength 5/5 all groups. NEURO: Alert and oriented x 3. Cranial nerves grossly intact. No focal sensory or motor deficits noted. SKIN: Warm. Dry. No rashes or other lesions noted. Lab Results   Component Value Date/Time    Sodium 140 09/07/2016 05:33 AM    Potassium 3.9 09/07/2016 05:33 AM    Chloride 109 09/07/2016 05:33 AM    CO2 23 09/07/2016 05:33 AM    Anion gap 8 09/07/2016 05:33 AM    Glucose 103 09/07/2016 05:33 AM    BUN 7 09/07/2016 05:33 AM    Creatinine 0.59 09/07/2016 05:33 AM    BUN/Creatinine ratio 12 09/07/2016 05:33 AM    GFR est AA >60 09/07/2016 05:33 AM    GFR est non-AA >60 09/07/2016 05:33 AM    Calcium 8.1 09/07/2016 05:33 AM    Bilirubin, total 0.2 09/07/2016 05:33 AM    ALT (SGPT) 15 09/07/2016 05:33 AM    AST (SGOT) 10 09/07/2016 05:33 AM    Alk.  phosphatase 75 09/07/2016 05:33 AM    Protein, total 6.4 09/07/2016 05:33 AM    Albumin 3.0 09/07/2016 05:33 AM    Globulin 3.4 09/07/2016 05:33 AM    A-G Ratio 0.9 09/07/2016 05:33 AM       Lab Results   Component Value Date/Time    Cholesterol, total 171 04/21/2016 12:43 PM    HDL Cholesterol 47 04/21/2016 12:43 PM    LDL, calculated 91 04/21/2016 12:43 PM    Triglyceride 164 04/21/2016 12:43 PM        Lab Results   Component Value Date/Time    Hemoglobin A1c 5.6 04/21/2016 12:43 PM       Lab Results   Component Value Date/Time    WBC 12.3 09/07/2016 05:33 AM    HGB 11.6 09/07/2016 05:33 AM    HCT 34.5 09/07/2016 05:33 AM    PLATELET 266 64/93/9844 05:33 AM    MCV 95.0 09/07/2016 05:33 AM         ASSESSMENT/ PLAN: Diagnoses and all orders for this visit:    1. Neuropathy  -     SED RATE (ESR)  -     HEMOGLOBIN A1C WITH EAG  -     VITAMIN P16  -     METABOLIC PANEL, COMPREHENSIVE  -     CBC WITH AUTOMATED DIFF  -     RPR  -     URINALYSIS W/ RFLX MICROSCOPIC    2. Fatigue, unspecified type  -     TSH 3RD GENERATION  -     T4, FREE  -     URINALYSIS W/ RFLX MICROSCOPIC    3. Polydipsia  -     HEMOGLOBIN A1C WITH EAG  -     URINALYSIS W/ RFLX MICROSCOPIC    4. Depression, unspecified depression type    5. Anxiety  -     sertraline (ZOLOFT) 50 mg tablet; Take 1 Tab by mouth daily. Follow-up Disposition:  Return in about 6 weeks (around 12/25/2017) for neuropathy, anxiety, fatigue; Dr. David Morfin.    I have reviewed the patient's medications and risks/side effects/benefits were discussed. Diagnosis(-es) explained to patient and questions answered. Literature provided where appropriate.

## 2017-11-13 NOTE — MR AVS SNAPSHOT
Visit Information Date & Time Provider Department Dept. Phone Encounter #  
 11/13/2017  4:00 PM Serena Ramirez, [address] [place] [address],[address] [phone] [id number] Follow-up Instructions Return in about 4 months (around 3/13/2018) for neuropathy, fatigue; Dr. Colton Rodriguez.  
  
Your Appointments 1/22/2018 10:00 AM  
ROUTINE CARE with Ancelmo Brock MD  
[facility] [address] [facility]) Appt Note: PAP only [phone]  
[address] [facility] [address] [place] [address] Upcoming Health Maintenance Date Due  
 PAP AKA CERVICAL CYTOLOGY 1/18/2001 Influenza Age 5 to Adult 8/1/2017 DTaP/Tdap/Td series (2 - Td) 3/2/2027 Allergies as of 11/13/2017  Review Complete On: 11/13/2017 By: Serena Ramirez MD  
  
 Severity Noted Reaction Type Reactions Pcn [Penicillins]  06/24/2012    Rash Pork/porcine Containing Products  01/20/2015    Rash Shellfish Containing Products  01/20/2015    Rash Current Immunizations  Never Reviewed No immunizations on file. Not reviewed this visit You Were Diagnosed With   
  
 Codes Comments Neuropathy    -  Primary ICD-10-CM: G62.9 ICD-9-CM: 355.9 Fatigue, unspecified type     ICD-10-CM: R53.83 ICD-9-CM: 780.79 Polydipsia     ICD-10-CM: R63.1 ICD-9-CM: 783.5 Depression, unspecified depression type     ICD-10-CM: F32.9 ICD-9-CM: 666 Vitals BP Pulse Temp Resp Height(growth percentile) Weight(growth percentile) 114/81 (BP 1 Location: Left arm, BP Patient Position: Sitting) (!) 110 98.2 °F (36.8 °C) (Oral) 16 5' (1.524 m) 183 lb (83 kg) LMP SpO2 BMI OB Status Smoking Status 08/08/2016 98% 35.74 kg/m2 Hysterectomy Former Smoker Vitals History BMI and BSA Data Body Mass Index Body Surface Area 35.74 kg/m 2 1.87 m 2 Preferred Pharmacy Pharmacy Name Phone [facility], [address] AT [facility] [facility] [address] [phone] Your Updated Medication List  
  
   
This list is accurate as of: 11/13/17  4:13 PM.  Always use your most recent med list.  
  
  
  
  
 busPIRone 7.5 mg tablet Commonly known as:  BUSPAR  
TAKE 1 TABLET BY MOUTH TWICE A DAY We Performed the Following CBC WITH AUTOMATED DIFF [83047 CPT(R)] HEMOGLOBIN A1C WITH EAG [39966 CPT(R)] METABOLIC PANEL, COMPREHENSIVE [15078 CPT(R)] RPR [71784 CPT(R)] SED RATE (ESR) [id number] CPT(R)] T4, FREE [id number] CPT(R)] TSH 3RD GENERATION [12074 CPT(R)] URINALYSIS W/ RFLX MICROSCOPIC [09001 CPT(R)] VITAMIN B12 [id number] CPT(R)] Follow-up Instructions Return in about 4 months (around 3/13/2018) for neuropathy, fatigue; Dr. Nelson Cook.  
  
  
Introducing [facility]! Dear Hardik Rob: 
Thank you for requesting a [employer] account. Our records indicate that you already have an active [employer] account. You can access your account anytime at [email or web address] Did you know that you can access your hospital and ER discharge instructions at any time in [employer]? You can also review all of your test results from your hospital stay or ER visit. Additional Information If you have questions, please visit the Frequently Asked Questions section of the [employer] website at [email or web address] Remember, [employer] is NOT to be used for urgent needs. For medical emergencies, dial 911. Now available from your iPhone and Android! Please provide this summary of care documentation to your next provider. Your primary care clinician is listed as Bulmaro Forrest. If you have any questions after today's visit, please call [phone].

## 2017-11-15 ENCOUNTER — TELEPHONE (OUTPATIENT)
Dept: INTERNAL MEDICINE CLINIC | Age: 37
End: 2017-11-15

## 2017-11-15 DIAGNOSIS — G62.9 PERIPHERAL POLYNEUROPATHY: Primary | ICD-10-CM

## 2017-11-15 LAB
ALBUMIN SERPL-MCNC: 4.5 G/DL (ref 3.5–5.5)
ALBUMIN/GLOB SERPL: 1.5 {RATIO} (ref 1.2–2.2)
ALP SERPL-CCNC: 91 IU/L (ref 39–117)
ALT SERPL-CCNC: 17 IU/L (ref 0–32)
APPEARANCE UR: ABNORMAL
AST SERPL-CCNC: 14 IU/L (ref 0–40)
BASOPHILS # BLD AUTO: 0 X10E3/UL (ref 0–0.2)
BASOPHILS NFR BLD AUTO: 0 %
BILIRUB SERPL-MCNC: 0.4 MG/DL (ref 0–1.2)
BILIRUB UR QL STRIP: NEGATIVE
BUN SERPL-MCNC: 10 MG/DL (ref 6–20)
BUN/CREAT SERPL: 11 (ref 9–23)
CALCIUM SERPL-MCNC: 9.4 MG/DL (ref 8.7–10.2)
CHLORIDE SERPL-SCNC: 101 MMOL/L (ref 96–106)
CO2 SERPL-SCNC: 20 MMOL/L (ref 18–29)
COLOR UR: YELLOW
CREAT SERPL-MCNC: 0.9 MG/DL (ref 0.57–1)
EOSINOPHIL # BLD AUTO: 0.2 X10E3/UL (ref 0–0.4)
EOSINOPHIL NFR BLD AUTO: 2 %
ERYTHROCYTE [DISTWIDTH] IN BLOOD BY AUTOMATED COUNT: 13.1 % (ref 12.3–15.4)
ERYTHROCYTE [SEDIMENTATION RATE] IN BLOOD BY WESTERGREN METHOD: 16 MM/HR (ref 0–32)
EST. AVERAGE GLUCOSE BLD GHB EST-MCNC: 105 MG/DL
GFR SERPLBLD CREATININE-BSD FMLA CKD-EPI: 82 ML/MIN/1.73
GFR SERPLBLD CREATININE-BSD FMLA CKD-EPI: 94 ML/MIN/1.73
GLOBULIN SER CALC-MCNC: 3 G/DL (ref 1.5–4.5)
GLUCOSE SERPL-MCNC: 101 MG/DL (ref 65–99)
GLUCOSE UR QL: NEGATIVE
HBA1C MFR BLD: 5.3 % (ref 4.8–5.6)
HCT VFR BLD AUTO: 39.4 % (ref 34–46.6)
HGB BLD-MCNC: 13 G/DL (ref 11.1–15.9)
HGB UR QL STRIP: NEGATIVE
IMM GRANULOCYTES # BLD: 0 X10E3/UL (ref 0–0.1)
IMM GRANULOCYTES NFR BLD: 0 %
KETONES UR QL STRIP: NEGATIVE
LEUKOCYTE ESTERASE UR QL STRIP: NEGATIVE
LYMPHOCYTES # BLD AUTO: 3.1 X10E3/UL (ref 0.7–3.1)
LYMPHOCYTES NFR BLD AUTO: 32 %
MCH RBC QN AUTO: 31.3 PG (ref 26.6–33)
MCHC RBC AUTO-ENTMCNC: 33 G/DL (ref 31.5–35.7)
MCV RBC AUTO: 95 FL (ref 79–97)
MICRO URNS: ABNORMAL
MONOCYTES # BLD AUTO: 0.6 X10E3/UL (ref 0.1–0.9)
MONOCYTES NFR BLD AUTO: 6 %
NEUTROPHILS # BLD AUTO: 5.8 X10E3/UL (ref 1.4–7)
NEUTROPHILS NFR BLD AUTO: 60 %
NITRITE UR QL STRIP: NEGATIVE
PH UR STRIP: 7.5 [PH] (ref 5–7.5)
PLATELET # BLD AUTO: 443 X10E3/UL (ref 150–379)
POTASSIUM SERPL-SCNC: 4.8 MMOL/L (ref 3.5–5.2)
PROT SERPL-MCNC: 7.5 G/DL (ref 6–8.5)
PROT UR QL STRIP: NEGATIVE
RBC # BLD AUTO: 4.16 X10E6/UL (ref 3.77–5.28)
RPR SER QL: NON REACTIVE
SODIUM SERPL-SCNC: 140 MMOL/L (ref 134–144)
SP GR UR: 1.02 (ref 1–1.03)
T4 FREE SERPL-MCNC: 1.3 NG/DL (ref 0.82–1.77)
TSH SERPL DL<=0.005 MIU/L-ACNC: 1.54 UIU/ML (ref 0.45–4.5)
UROBILINOGEN UR STRIP-MCNC: 0.2 MG/DL (ref 0.2–1)
VIT B12 SERPL-MCNC: 347 PG/ML (ref 211–946)
WBC # BLD AUTO: 9.7 X10E3/UL (ref 3.4–10.8)

## 2017-11-15 NOTE — PROGRESS NOTES
Please call the patient and let the patient know that her test result(s) is/are basically normal.  Thanks. Donal Carballo.

## 2017-11-16 RX ORDER — BUSPIRONE HYDROCHLORIDE 7.5 MG/1
TABLET ORAL
Qty: 90 TAB | Refills: 1 | Status: SHIPPED | OUTPATIENT
Start: 2017-11-16 | End: 2018-01-15 | Stop reason: SDUPTHER

## 2017-12-20 ENCOUNTER — OFFICE VISIT (OUTPATIENT)
Dept: NEUROLOGY | Age: 37
End: 2017-12-20

## 2017-12-20 VITALS
BODY MASS INDEX: 35.81 KG/M2 | OXYGEN SATURATION: 98 % | WEIGHT: 182.4 LBS | SYSTOLIC BLOOD PRESSURE: 110 MMHG | HEART RATE: 60 BPM | DIASTOLIC BLOOD PRESSURE: 72 MMHG | HEIGHT: 60 IN

## 2017-12-20 DIAGNOSIS — R20.0 NUMBNESS OF LEFT HAND: Primary | ICD-10-CM

## 2017-12-20 RX ORDER — PHENTERMINE HYDROCHLORIDE 37.5 MG/1
37.5 CAPSULE ORAL
COMMUNITY
End: 2018-02-13

## 2017-12-20 NOTE — PROGRESS NOTES
Neurology Note  REFERRED BY:  Charmaine Munoz MD    17    Chief Complaint   Patient presents with    New Patient     Peripheral polyneuropathy       HISTORY OF PRESENT ILLNESS  Piotr Hobbs is a 40 y.o. female who presented to the neurology office for management of left arm numbness. The numbness has been going on for the last 2 months and it is off and on. It does involve the medial forefingers of the left arm. She does also complain of pain around her left elbow. She has been using an elbow splint and that is helping her some. She does have urinary urgency. She denies any visual changes or any other focal neurological symptoms in the past.    Current Outpatient Prescriptions   Medication Sig    phentermine 37.5 mg capsule Take 37.5 mg by mouth every morning.  busPIRone (BUSPAR) 7.5 mg tablet TAKE 1 TABLET BY MOUTH THREE TIMES A DAY     No current facility-administered medications for this visit.       Allergies   Allergen Reactions    Pcn [Penicillins] Rash    Pork/Porcine Containing Products Rash    Shellfish Containing Products Rash    Zoloft [Sertraline] Other (comments)     Dysphoria, insomnia     Past Medical History:   Diagnosis Date    Chronic pain     low back    Uterine fibroid      Past Surgical History:   Procedure Laterality Date    HX  SECTION      HX  SECTION      HX OTHER SURGICAL      WISDOM TEETH EXTRACTION    HX TUBAL LIGATION      [facility]      fibroid removal by Dr. Ammon Chong     Family History   Problem Relation Age of Onset    Diabetes Mother     Asthma Mother     Elevated Lipids Mother     Liver Disease Father      CHIRROSIS    Alcohol abuse Father     No Known Problems Son     No Known Problems Son     Other Sister      PEPTIC ULCER    Diabetes Maternal Aunt     Diabetes Maternal Grandmother     Other Other      NO ANESTHESIA PROBLEMS IN FAMILY    Anesth Problems Neg Hx      Social History   Substance Use Topics    Smoking status: Former Smoker     Packs/day: 0.25     Years: 15.00     Types: Cigarettes     Quit date: 1/3/2017    Smokeless tobacco: Current User      Comment: GIVEN \"STOP SMOKING\" HANDOUT.    Alcohol use No       REVIEW OF SYSTEMS:   A ten system review of constitutional, cardiovascular, respiratory, musculoskeletal, endocrine, skin, SHEENT, genitourinary, psychiatric and neurologic systems was obtained and is unremarkable with the exception of anxiety, constipation, cough, depression, muscle pain, muscle weakness, poor appetite and snoring     EXAMINATION:   Visit Vitals    /72    Pulse 60    Ht 5' (1.524 m)    Wt 182 lb 6.4 oz (82.7 kg)    LMP 08/08/2016    SpO2 98%    BMI 35.62 kg/m2        General:   General appearance: Pt is in no acute distress   Distal pulses are preserved  Fundoscopic Exam: Attempted    Neurological Examination:   Mental Status: AAO x3. Speech is fluent. Follows commands, has normal fund of knowledge, attention, short term recall, comprehension and insight. Cranial Nerves: Visual fields are full. PERRL, Extraocular movements are full. Facial sensation intact V1- V3. Facial movement intact, symmetric. Hearing intact to conversation. Palate elevates symmetrically. Shoulder shrug symmetric. Tongue midline. Motor: Strength is 5/5 in all 4 ext. No atrophy. Tone: Normal    Sensation: Normal to light touch    Reflexes: DTRs 2+ throughout. Coordination/Cerebellar: Intact to finger-nose-finger     Gait: casual gait is normal.     Skin: No significant bruising or lacerations.     Laboratory review:   Results for orders placed or performed in visit on 11/13/17   SED RATE (ESR)   Result Value Ref Range    Sed rate (ESR) 16 0 - 32 mm/hr   HEMOGLOBIN A1C WITH EAG   Result Value Ref Range    Hemoglobin A1c 5.3 4.8 - 5.6 %    Estimated average glucose 105 mg/dL   VITAMIN B12   Result Value Ref Range    Vitamin B12 347 211 - 946 pg/mL METABOLIC PANEL, COMPREHENSIVE   Result Value Ref Range    Glucose 101 (H) 65 - 99 mg/dL    BUN 10 6 - 20 mg/dL    Creatinine 0.90 0.57 - 1.00 mg/dL    GFR est non-AA 82 >59 mL/min/1.73    GFR est AA 94 >59 mL/min/1.73    BUN/Creatinine ratio 11 9 - 23    Sodium 140 134 - 144 mmol/L    Potassium 4.8 3.5 - 5.2 mmol/L    Chloride 101 96 - 106 mmol/L    CO2 20 18 - 29 mmol/L    Calcium 9.4 8.7 - 10.2 mg/dL    Protein, total 7.5 6.0 - 8.5 g/dL    Albumin 4.5 3.5 - 5.5 g/dL    GLOBULIN, TOTAL 3.0 1.5 - 4.5 g/dL    A-G Ratio 1.5 1.2 - 2.2    Bilirubin, total 0.4 0.0 - 1.2 mg/dL    Alk. phosphatase 91 39 - 117 IU/L    AST (SGOT) 14 0 - 40 IU/L    ALT (SGPT) 17 0 - 32 IU/L   CBC WITH AUTOMATED DIFF   Result Value Ref Range    WBC 9.7 3.4 - 10.8 x10E3/uL    RBC 4.16 3.77 - 5.28 x10E6/uL    HGB 13.0 11.1 - 15.9 g/dL    HCT 39.4 34.0 - 46.6 %    MCV 95 79 - 97 fL    MCH 31.3 26.6 - 33.0 pg    MCHC 33.0 31.5 - 35.7 g/dL    RDW 13.1 12.3 - 15.4 %    PLATELET 090 (H) 524 - 379 x10E3/uL    NEUTROPHILS 60 Not Estab. %    Lymphocytes 32 Not Estab. %    MONOCYTES 6 Not Estab. %    EOSINOPHILS 2 Not Estab. %    BASOPHILS 0 Not Estab. %    ABS. NEUTROPHILS 5.8 1.4 - 7.0 x10E3/uL    Abs Lymphocytes 3.1 0.7 - 3.1 x10E3/uL    ABS. MONOCYTES 0.6 0.1 - 0.9 x10E3/uL    ABS. EOSINOPHILS 0.2 0.0 - 0.4 x10E3/uL    ABS. BASOPHILS 0.0 0.0 - 0.2 x10E3/uL    IMMATURE GRANULOCYTES 0 Not Estab. %    ABS. IMM.  GRANS. 0.0 0.0 - 0.1 x10E3/uL   RPR   Result Value Ref Range    RPR Non Reactive Non Reactive   TSH 3RD GENERATION   Result Value Ref Range    TSH 1.540 0.450 - 4.500 uIU/mL   T4, FREE   Result Value Ref Range    T4, Free 1.30 0.82 - 1.77 ng/dL   URINALYSIS W/ RFLX MICROSCOPIC   Result Value Ref Range    Specific Gravity 1.020 1.005 - 1.030    pH (UA) 7.5 5.0 - 7.5    Color Yellow Yellow    Appearance Cloudy (A) Clear    Leukocyte Esterase Negative Negative    Protein Negative Negative/Trace    Glucose Negative Negative    Ketone Negative Negative    Blood Negative Negative    Bilirubin Negative Negative    Urobilinogen 0.2 0.2 - 1.0 mg/dL    Nitrites Negative Negative    Microscopic Examination Comment        Imaging review:  None    Documentation review:  None    Assessment/Plan:   Reji Cardenas is a 40 y.o. female who presented to the neurology office for management of left arm numbness that has been going on intermittently for the last 2 months. It has improved since the time she has been using left elbow splint. I do suspect a left ulnar neuropathy. That will perform an EMG/nerve conduction study to check for left ulnar neuropathy and left cervical radiculopathy. If it is negative, we will proceed with MRI of the brain and cervical spine to rule out multiple sclerosis. Follow-up after the EMG/nerve conduction study. ICD-10-CM ICD-9-CM    1. Numbness of left hand R20.0 782.0       Thank you for allowing me to participate in the care of Ms. Aby Patterson. Please feel free to contact me if you have any questions. Barbi Angulo MD  Neurologist    CC: Nemo Lopez MD  Fax: [phone]    This note was created using voice recognition software. Despite editing, there may be syntax errors. This note will not be viewable in [address]

## 2017-12-20 NOTE — LETTER
2017 2:36 PM 
 
Patient:    Abhilash Lomeli YOB: 1980 Date of Visit:    2017 Dear Wilfredo Ashley MD 
 
Thank you for referring Ms. Abhilash Lomeli to me for evaluation/treatment. Below are the relevant portions of my assessment and plan of care. Neurology Note REFERRED BY: 
Wilfredo Ashley MD 
 
17 Chief Complaint Patient presents with  New Patient Peripheral polyneuropathy HISTORY OF PRESENT ILLNESS Abhilash Lomeli is a 40 y.o. female who presented to the neurology office for management of left arm numbness. The numbness has been going on for the last 2 months and it is off and on. It does involve the medial forefingers of the left arm. She does also complain of pain around her left elbow. She has been using an elbow splint and that is helping her some. She does have urinary urgency. She denies any visual changes or any other focal neurological symptoms in the past. 
 
Current Outpatient Prescriptions Medication Sig  phentermine 37.5 mg capsule Take 37.5 mg by mouth every morning.  busPIRone (BUSPAR) 7.5 mg tablet TAKE 1 TABLET BY MOUTH THREE TIMES A DAY No current facility-administered medications for this visit. Allergies Allergen Reactions  Pcn [Penicillins] Rash  Pork/Porcine Containing Products Rash  Shellfish Containing Products Rash  Zoloft [Sertraline] Other (comments) Dysphoria, insomnia Past Medical History:  
Diagnosis Date  Chronic pain   
 low back  Uterine fibroid Past Surgical History:  
Procedure Laterality Date  HX  SECTION    HX  SECTION    HX OTHER SURGICAL    
 WISDOM TEETH EXTRACTION  
 HX TUBAL LIGATION    
 MYOMECTOMY 1-4,W/TOT 250GMS/<, [address]  2014  
 fibroid removal by Dr. Gen Hwang Family History Problem Relation Age of Onset  Diabetes Mother  Asthma Mother  Elevated Lipids Mother  Liver Disease Father Luci Ware  Alcohol abuse Father  No Known Problems Son  No Known Problems Son   
[facility] Other Sister PEPTIC ULCER  
 Diabetes Maternal Aunt  Diabetes Maternal Grandmother  Other Other NO ANESTHESIA PROBLEMS IN FAMILY  Anesth Problems Neg Hx Social History Substance Use Topics  Smoking status: Former Smoker Packs/day: 0.25 Years: 15.00 Types: Cigarettes Quit date: 1/3/2017  Smokeless tobacco: Current User Comment: GIVEN \"STOP SMOKING\" HANDOUT.  Alcohol use No  
 
 
REVIEW OF SYSTEMS:  
A ten system review of constitutional, cardiovascular, respiratory, musculoskeletal, endocrine, skin, SHEENT, genitourinary, psychiatric and neurologic systems was obtained and is unremarkable with the exception of anxiety, constipation, cough, depression, muscle pain, muscle weakness, poor appetite and snoring EXAMINATION:  
Visit Vitals  /72  Pulse 60  Ht 5' (1.524 m)  Wt 182 lb 6.4 oz (82.7 kg)  LMP 08/08/2016  SpO2 98%  BMI 35.62 kg/m2 General:  
General appearance: Pt is in no acute distress Distal pulses are preserved Fundoscopic Exam: Attempted Neurological Examination:  
Mental Status: AAO x3. Speech is fluent. Follows commands, has normal fund of knowledge, attention, short term recall, comprehension and insight. Cranial Nerves: Visual fields are full. PERRL, Extraocular movements are full. Facial sensation intact V1- V3. Facial movement intact, symmetric. Hearing intact to conversation. Palate elevates symmetrically. Shoulder shrug symmetric. Tongue midline. Motor: Strength is 5/5 in all 4 ext. No atrophy. Tone: Normal 
 
Sensation: Normal to light touch Reflexes: DTRs 2+ throughout. Coordination/Cerebellar: Intact to finger-nose-finger Gait: casual gait is normal.  
 
Skin: No significant bruising or lacerations. Laboratory review: Results for orders placed or performed in visit on 11/13/17 SED RATE (ESR) Result Value Ref Range Sed rate (ESR) 16 0 - 32 mm/hr HEMOGLOBIN A1C WITH EAG Result Value Ref Range Hemoglobin A1c 5.3 4.8 - 5.6 % Estimated average glucose 105 mg/dL VITAMIN B12 Result Value Ref Range Vitamin B12 347 211 - 946 pg/mL METABOLIC PANEL, COMPREHENSIVE Result Value Ref Range Glucose 101 (H) 65 - 99 mg/dL BUN 10 6 - 20 mg/dL Creatinine 0.90 0.57 - 1.00 mg/dL GFR est non-AA 82 >59 mL/min/1.73 GFR est AA 94 >59 mL/min/1.73  
 BUN/Creatinine ratio 11 9 - 23 Sodium 140 134 - 144 mmol/L Potassium 4.8 3.5 - 5.2 mmol/L Chloride 101 96 - 106 mmol/L  
 CO2 20 18 - 29 mmol/L Calcium 9.4 8.7 - 10.2 mg/dL Protein, total 7.5 6.0 - 8.5 g/dL Albumin 4.5 3.5 - 5.5 g/dL GLOBULIN, TOTAL 3.0 1.5 - 4.5 g/dL A-G Ratio 1.5 1.2 - 2.2 Bilirubin, total 0.4 0.0 - 1.2 mg/dL Alk. phosphatase 91 39 - 117 IU/L  
 AST (SGOT) 14 0 - 40 IU/L  
 ALT (SGPT) 17 0 - 32 IU/L  
CBC WITH AUTOMATED DIFF Result Value Ref Range WBC 9.7 3.4 - 10.8 x10E3/uL  
 RBC 4.16 3.77 - 5.28 x10E6/uL HGB 13.0 11.1 - 15.9 g/dL HCT 39.4 34.0 - 46.6 % MCV 95 79 - 97 fL  
 MCH 31.3 26.6 - 33.0 pg  
 MCHC 33.0 31.5 - 35.7 g/dL  
 RDW 13.1 12.3 - 15.4 % PLATELET 049 (H) 507 - 379 x10E3/uL NEUTROPHILS 60 Not Estab. % Lymphocytes 32 Not Estab. % MONOCYTES 6 Not Estab. % EOSINOPHILS 2 Not Estab. % BASOPHILS 0 Not Estab. %  
 ABS. NEUTROPHILS 5.8 1.4 - 7.0 x10E3/uL Abs Lymphocytes 3.1 0.7 - 3.1 x10E3/uL  
 ABS. MONOCYTES 0.6 0.1 - 0.9 x10E3/uL  
 ABS. EOSINOPHILS 0.2 0.0 - 0.4 x10E3/uL  
 ABS. BASOPHILS 0.0 0.0 - 0.2 x10E3/uL IMMATURE GRANULOCYTES 0 Not Estab. %  
 ABS. IMM. GRANS. 0.0 0.0 - 0.1 x10E3/uL  
RPR Result Value Ref Range RPR Non Reactive Non Reactive TSH 3RD GENERATION Result Value Ref Range TSH 1.540 0.450 - 4.500 uIU/mL T4, FREE Result Value Ref Range T4, Free 1.30 0.82 - 1.77 ng/dL URINALYSIS W/ RFLX MICROSCOPIC Result Value Ref Range Specific Gravity 1.020 1.005 - 1.030  
 pH (UA) 7.5 5.0 - 7.5 Color Yellow Yellow Appearance Cloudy (A) Clear Leukocyte Esterase Negative Negative Protein Negative Negative/Trace Glucose Negative Negative Ketone Negative Negative Blood Negative Negative Bilirubin Negative Negative Urobilinogen 0.2 0.2 - 1.0 mg/dL Nitrites Negative Negative Microscopic Examination Comment Imaging review: 
None Documentation review: 
None Assessment/Plan:  
Miguel Reyes is a 40 y.o. female who presented to the neurology office for management of left arm numbness that has been going on intermittently for the last 2 months. It has improved since the time she has been using left elbow splint. I do suspect a left ulnar neuropathy. That will perform an EMG/nerve conduction study to check for left ulnar neuropathy and left cervical radiculopathy. If it is negative, we will proceed with MRI of the brain and cervical spine to rule out multiple sclerosis. Follow-up after the EMG/nerve conduction study. ICD-10-CM ICD-9-CM 1. Numbness of left hand R20.0 782.0 Thank you for allowing me to participate in the care of Ms. Rony Hodgson. Please feel free to contact me if you have any questions. Etta Bray MD 
Neurologist 
 
CC: Tish Capps MD 
Fax: [phone] This note was created using voice recognition software. Despite editing, there may be syntax errors. This note will not be viewable in [address] If you have questions, please do not hesitate to call me. I look forward to following Ms. Rony Hodgson along with you. Sincerely, Etta Bray MD

## 2017-12-20 NOTE — MR AVS SNAPSHOT
Visit Information Date & Time Provider Department Dept. Phone Encounter #  
 12/20/2017  2:00 PM Shankar Malloy MD [facility] [phone] [id number] Your Appointments 1/22/2018 10:00 AM  
ROUTINE CARE with Yasmany Smith MD  
[facility]) Appt Note: PAP only [phone] [id number] [phone]  
  
   
 [address]  [address] [address]  
  
    
 1/25/2018  1:00 PM  
PROCEDURE with Shankar Malloy MD  
[facility] Appt Note: EMG Procedure [address] [facility], 
[address], [address] [phone]  
[address], [address], [address] [address] Upcoming Health Maintenance Date Due  
 PAP AKA CERVICAL CYTOLOGY 1/18/2001 DTaP/Tdap/Td series (2 - Td) 3/2/2027 Allergies as of 12/20/2017  Review Complete On: 12/20/2017 By: Shankar Malloy MD  
  
 Severity Noted Reaction Type Reactions Pcn [Penicillins]  06/24/2012    Rash Pork/porcine Containing Products  01/20/2015    Rash Shellfish Containing Products  01/20/2015    Rash  
 Zoloft [Sertraline]  11/16/2017    Other (comments) Dysphoria, insomnia Current Immunizations  Never Reviewed No immunizations on file. Not reviewed this visit You Were Diagnosed With   
  
 Codes Comments Numbness of left hand    -  Primary ICD-10-CM: R20.0 ICD-9-CM: 040. 0 Vitals BP Pulse Height(growth percentile) Weight(growth percentile) LMP SpO2  
 110/72 60 5' (1.524 m) 182 lb 6.4 oz (82.7 kg) 08/08/2016 98% BMI OB Status Smoking Status 35.62 kg/m2 Hysterectomy Former Smoker BMI and BSA Data Body Mass Index Body Surface Area  
 35.62 kg/m 2 1.87 m 2 Preferred Pharmacy Pharmacy Name Phone [facility], [address] AT [address] [place] [address] Drive [phone] Your Updated Medication List  
  
   
This list is accurate as of: 12/20/17  2:37 PM.  Always use your most recent med list.  
  
  
  
  
 busPIRone 7.5 mg tablet Commonly known as:  BUSPAR  
TAKE 1 TABLET BY MOUTH THREE TIMES A DAY phentermine 37.5 mg capsule Take 37.5 mg by mouth every morning. Patient Instructions Please schedule for EMG/nerve conduction study A Healthy Lifestyle: Care Instructions Your Care Instructions A healthy lifestyle can help you feel good, stay at a healthy weight, and have plenty of energy for both work and play. A healthy lifestyle is something you can share with your whole family. A healthy lifestyle also can lower your risk for serious health problems, such as high blood pressure, heart disease, and diabetes. You can follow a few steps listed below to improve your health and the health of your family. Follow-up care is a key part of your treatment and safety. Be sure to make and go to all appointments, and call your doctor if you are having problems. It's also a good idea to know your test results and keep a list of the medicines you take. How can you care for yourself at home? · Do not eat too much sugar, fat, or fast foods. You can still have dessert and treats now and then. The goal is moderation. · Start small to improve your eating habits. Pay attention to portion sizes, drink less juice and soda pop, and eat more fruits and vegetables. ¨ Eat a healthy amount of food. A 3-ounce serving of meat, for example, is about the size of a deck of cards. Fill the rest of your plate with vegetables and whole grains. ¨ Limit the amount of soda and sports drinks you have every day. Drink more water when you are thirsty. ¨ Eat at least 5 servings of fruits and vegetables every day.  It may seem like a lot, but it is not hard to reach this goal. A serving or helping is 1 piece of fruit, 1 cup of vegetables, or 2 cups of leafy, raw vegetables. Have an apple or some carrot sticks as an afternoon snack instead of a candy bar. Try to have fruits and/or vegetables at every meal. 
· Make exercise part of your daily routine. You may want to start with simple activities, such as walking, bicycling, or slow swimming. Try to be active 30 to 60 minutes every day. You do not need to do all 30 to 60 minutes all at once. For example, you can exercise 3 times a day for 10 or 20 minutes. Moderate exercise is safe for most people, but it is always a good idea to talk to your doctor before starting an exercise program. 
· Keep moving. Benson Villafuerte the lawn, work in the garden, or [employer] Take the stairs instead of the elevator at work. · If you smoke, quit. People who smoke have an increased risk for heart attack, stroke, cancer, and other lung illnesses. Quitting is hard, but there are ways to boost your chance of quitting tobacco for good. ¨ Use nicotine gum, patches, or lozenges. ¨ Ask your doctor about stop-smoking programs and medicines. ¨ Keep trying. In addition to reducing your risk of diseases in the future, you will notice some benefits soon after you stop using tobacco. If you have shortness of breath or asthma symptoms, they will likely get better within a few weeks after you quit. · Limit how much alcohol you drink. Moderate amounts of alcohol (up to 2 drinks a day for men, 1 drink a day for women) are okay. But drinking too much can lead to liver problems, high blood pressure, and other health problems. Family health If you have a family, there are many things you can do together to improve your health. · Eat meals together as a family as often as possible. · Eat healthy foods. This includes fruits, vegetables, lean meats and dairy, and whole grains. · Include your family in your fitness plan. Most people think of activities such as jogging or tennis as the way to fitness, but there are many ways you and your family can be more active. Anything that makes you breathe hard and gets your heart pumping is exercise. Here are some tips: 
¨ Walk to do errands or to take your child to school or the bus. ¨ Go for a family bike ride after dinner instead of watching TV. Where can you learn more? Go to http://mansoor-eugene.info/. Enter S637 in the search box to learn more about \"A Healthy Lifestyle: Care Instructions. \" Current as of: May 12, 2017 Content Version: 11.4 © 0987-1460 [employer]. Care instructions adapted under license by [employer] (which disclaims liability or warranty for this information). If you have questions about a medical condition or this instruction, always ask your healthcare professional. [place] [address] any warranty or liability for your use of this information. Patient Instructions History Introducing [facility]! Dear Robbie Velazquez: 
Thank you for requesting a [employer] account. Our records indicate that you already have an active [employer] account. You can access your account anytime at [email or web address] [employer] Did you know that you can access your hospital and ER discharge instructions at any time in [employer]? You can also review all of your test results from your hospital stay or ER visit. Additional Information If you have questions, please visit the Frequently Asked Questions section of the [employer] website at [email or web address]/. Remember, [employer] is NOT to be used for urgent needs. For medical emergencies, dial 911. Now available from your iPhone and Android! Please provide this summary of care documentation to your next provider.  
  
  
 Your primary care clinician is listed as Rojas Joyner. If you have any questions after today's visit, please call [phone].

## 2017-12-20 NOTE — PATIENT INSTRUCTIONS
Please schedule for EMG/nerve conduction study     A Healthy Lifestyle: Care Instructions  Your Care Instructions    A healthy lifestyle can help you feel good, stay at a healthy weight, and have plenty of energy for both work and play. A healthy lifestyle is something you can share with your whole family. A healthy lifestyle also can lower your risk for serious health problems, such as high blood pressure, heart disease, and diabetes. You can follow a few steps listed below to improve your health and the health of your family. Follow-up care is a key part of your treatment and safety. Be sure to make and go to all appointments, and call your doctor if you are having problems. It's also a good idea to know your test results and keep a list of the medicines you take. How can you care for yourself at home? · Do not eat too much sugar, fat, or fast foods. You can still have dessert and treats now and then. The goal is moderation. · Start small to improve your eating habits. Pay attention to portion sizes, drink less juice and soda pop, and eat more fruits and vegetables. ¨ Eat a healthy amount of food. A 3-ounce serving of meat, for example, is about the size of a deck of cards. Fill the rest of your plate with vegetables and whole grains. ¨ Limit the amount of soda and sports drinks you have every day. Drink more water when you are thirsty. ¨ Eat at least 5 servings of fruits and vegetables every day. It may seem like a lot, but it is not hard to reach this goal. A serving or helping is 1 piece of fruit, 1 cup of vegetables, or 2 cups of leafy, raw vegetables. Have an apple or some carrot sticks as an afternoon snack instead of a candy bar. Try to have fruits and/or vegetables at every meal.  · Make exercise part of your daily routine. You may want to start with simple activities, such as walking, bicycling, or slow swimming. Try to be active 30 to 60 minutes every day.  You do not need to do all 30 to 60 minutes all at once. For example, you can exercise 3 times a day for 10 or 20 minutes. Moderate exercise is safe for most people, but it is always a good idea to talk to your doctor before starting an exercise program.  · Keep moving. Jim Cluck the lawn, work in the garden, or [employer]. Take the stairs instead of the elevator at work. · If you smoke, quit. People who smoke have an increased risk for heart attack, stroke, cancer, and other lung illnesses. Quitting is hard, but there are ways to boost your chance of quitting tobacco for good. ¨ Use nicotine gum, patches, or lozenges. ¨ Ask your doctor about stop-smoking programs and medicines. ¨ Keep trying. In addition to reducing your risk of diseases in the future, you will notice some benefits soon after you stop using tobacco. If you have shortness of breath or asthma symptoms, they will likely get better within a few weeks after you quit. · Limit how much alcohol you drink. Moderate amounts of alcohol (up to 2 drinks a day for men, 1 drink a day for women) are okay. But drinking too much can lead to liver problems, high blood pressure, and other health problems. Family health  If you have a family, there are many things you can do together to improve your health. · Eat meals together as a family as often as possible. · Eat healthy foods. This includes fruits, vegetables, lean meats and dairy, and whole grains. · Include your family in your fitness plan. Most people think of activities such as jogging or tennis as the way to fitness, but there are many ways you and your family can be more active. Anything that makes you breathe hard and gets your heart pumping is exercise. Here are some tips:  ¨ Walk to do errands or to take your child to school or the bus. ¨ Go for a family bike ride after dinner instead of watching TV. Where can you learn more? Go to http://mansoor-eugene.info/.   Enter L169 in the search box to learn more about \"A Healthy Lifestyle: Care Instructions. \"  Current as of: May 12, 2017  Content Version: 11.4  © 2732-1866 Healthwise[employer] Inc[employer]. Care instructions adapted under license by [employer] (which disclaims liability or warranty for this information). If you have questions about a medical condition or this instruction, always ask your healthcare professional. [address] any warranty or liability for your use of this information.

## 2018-01-10 ENCOUNTER — HOSPITAL ENCOUNTER (EMERGENCY)
Age: 38
Discharge: HOME | End: 2018-01-10
Attending: FAMILY MEDICINE

## 2018-01-10 VITALS
HEART RATE: 115 BPM | SYSTOLIC BLOOD PRESSURE: 128 MMHG | OXYGEN SATURATION: 98 % | WEIGHT: 170 LBS | TEMPERATURE: 97.8 F | HEIGHT: 60 IN | RESPIRATION RATE: 18 BRPM | BODY MASS INDEX: 33.38 KG/M2 | DIASTOLIC BLOOD PRESSURE: 67 MMHG

## 2018-01-10 DIAGNOSIS — J20.9 ACUTE BRONCHITIS, UNSPECIFIED ORGANISM: Primary | ICD-10-CM

## 2018-01-10 RX ORDER — DOXYCYCLINE 100 MG/1
100 CAPSULE ORAL 2 TIMES DAILY
Qty: 20 CAP | Refills: 0 | Status: SHIPPED | OUTPATIENT
Start: 2018-01-10 | End: 2018-01-20

## 2018-01-10 RX ORDER — BENZONATATE 200 MG/1
200 CAPSULE ORAL
Qty: 30 CAP | Refills: 0 | Status: SHIPPED | OUTPATIENT
Start: 2018-01-10 | End: 2018-02-13

## 2018-01-10 NOTE — UC PROVIDER NOTE
Patient is a 40 y.o. female presenting with cough. The history is provided by the patient. Cough   This is a new problem. Episode onset: 5 days ago. The problem occurs every few minutes. The problem has been gradually worsening. The cough is productive of sputum. There has been no fever. Associated symptoms include rhinorrhea. Pertinent negatives include no chest pain, no chills, no sweats, no ear congestion, no ear pain, no headaches, no sore throat, no myalgias, no shortness of breath and no wheezing. She has tried cough syrup and decongestants for the symptoms. Her past medical history does not include asthma. Past Medical History:   Diagnosis Date    Chronic pain     low back    Uterine fibroid         Past Surgical History:   Procedure Laterality Date    HX  SECTION      HX  SECTION      HX OTHER SURGICAL      WISDOM TEETH EXTRACTION    HX TUBAL LIGATION      MYOMECTOMY 1-4,W/TOT 250GMS/<, [address]      fibroid removal by Dr. Morales [place]         Family History   Problem Relation Age of Onset    Diabetes Mother     Asthma Mother     Elevated Lipids Mother     Liver Disease Father      CHIRROSIS    Alcohol abuse Father     No Known Problems Son     No Known Problems Son     Other Sister      PEPTIC ULCER    Diabetes Maternal Aunt     Diabetes Maternal Grandmother     Other Other      NO ANESTHESIA PROBLEMS IN FAMILY    Anesth Problems Neg Hx         Social History     Social History    Marital status:      Spouse name: N/A    Number of children: N/A    Years of education: N/A     Occupational History    Not on file.      Social History Main Topics    Smoking status: Former Smoker     Packs/day: 0.25     Years: 15.00     Types: Cigarettes     Quit date: 1/3/2017    Smokeless tobacco: Current User      Comment: GIVEN \"STOP SMOKING\" HANDOUT.    Alcohol use No    Drug use: No    Sexual activity: Yes     Partners: Male     Birth control/ protection: Surgical      Comment:  has 2 young children     Other Topics Concern    Not on file     Social History Narrative                ALLERGIES: Pcn [penicillins]; Pork/porcine containing products; Shellfish containing products; and Zoloft [sertraline]    Review of Systems   Constitutional: Negative for chills and fever. HENT: Positive for congestion and rhinorrhea. Negative for ear pain and sore throat. Respiratory: Positive for cough. Negative for shortness of breath and wheezing. Cardiovascular: Negative for chest pain and palpitations. Musculoskeletal: Negative for myalgias. Neurological: Negative for headaches. Vitals:    01/10/18 1547 01/10/18 1608   BP: 128/67    Pulse: (!) 121 (!) 115   Resp: 18    Temp: 97.8 °F (36.6 °C)    SpO2: 97% 98%   Weight: 77.1 kg (170 lb)    Height: 5' (1.524 m)        Physical Exam   Constitutional: She appears well-developed and well-nourished. No distress. HENT:   Right Ear: External ear and ear canal normal. A middle ear effusion is present. Left Ear: Tympanic membrane, external ear and ear canal normal.   Nose: Rhinorrhea present. Right sinus exhibits no maxillary sinus tenderness and no frontal sinus tenderness. Left sinus exhibits no maxillary sinus tenderness and no frontal sinus tenderness. Mouth/Throat: Mucous membranes are normal. Posterior oropharyngeal erythema present. No oropharyngeal exudate, posterior oropharyngeal edema or tonsillar abscesses. Cardiovascular: Normal rate, regular rhythm and normal heart sounds. Pulmonary/Chest: Effort normal and breath sounds normal. No respiratory distress. She has no wheezes. She has no rales. Lymphadenopathy:     She has no cervical adenopathy. Neurological: She is alert. Skin: She is not diaphoretic. Psychiatric: She has a normal mood and affect. Her behavior is normal. Judgment and thought content normal.   Nursing note and vitals reviewed.       MDM     Differential Diagnosis; Clinical Impression; Plan:     CLINICAL IMPRESSION:  Acute bronchitis, unspecified organism  (primary encounter diagnosis)    Plan:  1. Doxycycline   2. Tessalon prn  3. PCP INI  Risk of Significant Complications, Morbidity, and/or Mortality:   Presenting problems: Moderate  Management options:   Moderate  Progress:   Patient progress:  Stable      Procedures

## 2018-01-10 NOTE — DISCHARGE INSTRUCTIONS
Bronchitis: Care Instructions  Your Care Instructions    Bronchitis is inflammation of the bronchial tubes, which carry air to the lungs. The tubes swell and produce mucus, or phlegm. The mucus and inflamed bronchial tubes make you cough. You may have trouble breathing. Most cases of bronchitis are caused by viruses like those that cause colds. Antibiotics usually do not help and they may be harmful. Bronchitis usually develops rapidly and lasts about 2 to 3 weeks in otherwise healthy people. Follow-up care is a key part of your treatment and safety. Be sure to make and go to all appointments, and call your doctor if you are having problems. It's also a good idea to know your test results and keep a list of the medicines you take. How can you care for yourself at home? · Take all medicines exactly as prescribed. Call your doctor if you think you are having a problem with your medicine. · Get some extra rest.  · Take an over-the-counter pain medicine, such as acetaminophen (Tylenol), ibuprofen (Advil, Motrin), or naproxen (Aleve) to reduce fever and relieve body aches. Read and follow all instructions on the label. · Do not take two or more pain medicines at the same time unless the doctor told you to. Many pain medicines have acetaminophen, which is Tylenol. Too much acetaminophen (Tylenol) can be harmful. · Take an over-the-counter cough medicine that contains dextromethorphan to help quiet a dry, hacking cough so that you can sleep. Avoid cough medicines that have more than one active ingredient. Read and follow all instructions on the label. · Breathe moist air from a humidifier, hot shower, or sink filled with hot water. The heat and moisture will thin mucus so you can cough it out. · Do not smoke. Smoking can make bronchitis worse. If you need help quitting, talk to your doctor about stop-smoking programs and medicines. These can increase your chances of quitting for good.   When should you call for help? Call 911 anytime you think you may need emergency care. For example, call if:  ? · You have severe trouble breathing. ?Call your doctor now or seek immediate medical care if:  ? · You have new or worse trouble breathing. ? · You cough up dark brown or bloody mucus (sputum). ? · You have a new or higher fever. ? · You have a new rash. ? Watch closely for changes in your health, and be sure to contact your doctor if:  ? · You cough more deeply or more often, especially if you notice more mucus or a change in the color of your mucus. ? · You are not getting better as expected. Where can you learn more? Go to http://mansoor-eugene.info/. Enter H333 in the search box to learn more about \"Bronchitis: Care Instructions. \"  Current as of: May 12, 2017  Content Version: 11.4  © 6799-4131 [employer]. Care instructions adapted under license by [employer] (which disclaims liability or warranty for this information). If you have questions about a medical condition or this instruction, always ask your healthcare professional. [address] any warranty or liability for your use of this information.

## 2018-01-15 RX ORDER — BUSPIRONE HYDROCHLORIDE 7.5 MG/1
TABLET ORAL
Qty: 90 TAB | Refills: 0 | Status: SHIPPED | OUTPATIENT
Start: 2018-01-15 | End: 2018-02-13 | Stop reason: SDUPTHER

## 2018-01-25 ENCOUNTER — OFFICE VISIT (OUTPATIENT)
Dept: NEUROLOGY | Age: 38
End: 2018-01-25

## 2018-01-25 ENCOUNTER — OFFICE VISIT (OUTPATIENT)
Dept: INTERNAL MEDICINE CLINIC | Age: 38
End: 2018-01-25

## 2018-01-25 VITALS — HEART RATE: 93 BPM | OXYGEN SATURATION: 100 % | SYSTOLIC BLOOD PRESSURE: 130 MMHG | DIASTOLIC BLOOD PRESSURE: 79 MMHG

## 2018-01-25 VITALS
OXYGEN SATURATION: 99 % | DIASTOLIC BLOOD PRESSURE: 85 MMHG | HEART RATE: 110 BPM | TEMPERATURE: 98.3 F | WEIGHT: 188 LBS | HEIGHT: 60 IN | SYSTOLIC BLOOD PRESSURE: 127 MMHG | BODY MASS INDEX: 36.91 KG/M2 | RESPIRATION RATE: 18 BRPM

## 2018-01-25 DIAGNOSIS — F51.01 PRIMARY INSOMNIA: Primary | ICD-10-CM

## 2018-01-25 DIAGNOSIS — R20.0 NUMBNESS OF LEFT HAND: Primary | ICD-10-CM

## 2018-01-25 DIAGNOSIS — G56.22 ULNAR NEUROPATHY AT ELBOW OF LEFT UPPER EXTREMITY: ICD-10-CM

## 2018-01-25 DIAGNOSIS — L03.221 CELLULITIS OF NECK: ICD-10-CM

## 2018-01-25 RX ORDER — ZOLPIDEM TARTRATE 5 MG/1
5 TABLET ORAL
Qty: 10 TAB | Refills: 0 | Status: SHIPPED | OUTPATIENT
Start: 2018-01-25 | End: 2019-10-22 | Stop reason: SDUPTHER

## 2018-01-25 RX ORDER — TRAZODONE HYDROCHLORIDE 50 MG/1
25 TABLET ORAL
Qty: 30 TAB | Refills: 1 | Status: SHIPPED | OUTPATIENT
Start: 2018-01-25 | End: 2018-02-13

## 2018-01-25 RX ORDER — DOXYCYCLINE 100 MG/1
100 CAPSULE ORAL 2 TIMES DAILY
Qty: 20 CAP | Refills: 0 | Status: SHIPPED | OUTPATIENT
Start: 2018-01-25 | End: 2018-02-13

## 2018-01-25 NOTE — PROGRESS NOTES
Neurology Note  REFERRED BY:  Мария Pastrana MD    18    Chief complaint: Left arm numbness    SUBJECTIVE:    Chandrika Murphy is a 45 y.o. female who presented to the neurology office for management of left arm numbness. The numbness has been going on since the end of 2017 and it is off and on. It does involve the medial 4 fingers of the left arm. She does also complain of pain around her left elbow. She has been using an elbow splint and that is helping her some. She does have urinary urgency. She denies any visual changes or any other focal neurological symptoms in the past.    EMG/nerve conduction study done today is normal.  Symptoms are about the same as last visit. Current Outpatient Prescriptions   Medication Sig    busPIRone (BUSPAR) 7.5 mg tablet TAKE 1 TABLET BY MOUTH THREE TIMES DAILY    benzonatate (TESSALON) 200 mg capsule Take 1 Cap by mouth three (3) times daily as needed for Cough.  phentermine 37.5 mg capsule Take 37.5 mg by mouth every morning. No current facility-administered medications for this visit.       Allergies   Allergen Reactions    Pcn [Penicillins] Rash    Pork/Porcine Containing Products Rash    Shellfish Containing Products Rash    Zoloft [Sertraline] Other (comments)     Dysphoria, insomnia     Past Medical History:   Diagnosis Date    Chronic pain     low back    Uterine fibroid      Past Surgical History:   Procedure Laterality Date    HX  SECTION      HX  SECTION      HX OTHER SURGICAL      WISDOM TEETH EXTRACTION    HX TUBAL LIGATION      MYOMECTOMY 1-4,W/TOT 250GMS/<, [address]      fibroid removal by Dr. Sara Santiago     Family History   Problem Relation Age of Onset    Diabetes Mother     Asthma Mother     Elevated Lipids Mother     Liver Disease Father      CHIRROSIS    Alcohol abuse Father     No Known Problems Son     No Known Problems Son     Other Sister      PEPTIC ULCER    Diabetes Maternal Aunt     Diabetes Maternal Grandmother     Other Other      NO ANESTHESIA PROBLEMS IN FAMILY    Anesth Problems Neg Hx      Social History   Substance Use Topics    Smoking status: Former Smoker     Packs/day: 0.25     Years: 15.00     Types: Cigarettes     Quit date: 1/3/2017    Smokeless tobacco: Current User      Comment: GIVEN \"STOP SMOKING\" HANDOUT.    Alcohol use No       REVIEW OF SYSTEMS:   A ten system review of constitutional, cardiovascular, respiratory, musculoskeletal, endocrine, skin, SHEENT, genitourinary, psychiatric and neurologic systems was obtained and is unremarkable with the exception of anxiety, constipation, cough, depression, muscle pain, muscle weakness, poor appetite and snoring     EXAMINATION:   Visit Vitals    /79 (BP 1 Location: Right arm, BP Patient Position: Sitting)    Pulse 93    LMP 08/08/2016    SpO2 100%        General:   General appearance: Pt is in no acute distress   Distal pulses are preserved    Neurological Examination:   Mental Status: AAO x3. Speech is fluent. Follows commands, has normal fund of knowledge, attention, short term recall, comprehension and insight. Cranial Nerves: Visual fields are full. PERRL, Extraocular movements are full. Facial sensation intact V1- V3. Facial movement intact, symmetric. Hearing intact to conversation. Palate elevates symmetrically. Shoulder shrug symmetric. Tongue midline. Motor: Strength is 5/5 in all 4 ext. No atrophy. Tone: Normal    Sensation: Normal to light touch    Coordination/Cerebellar: Intact to finger-nose-finger     Gait: casual gait is normal.     Skin: No significant bruising or lacerations.     Laboratory review:   Results for orders placed or performed in visit on 11/13/17   SED RATE (ESR)   Result Value Ref Range    Sed rate (ESR) 16 0 - 32 mm/hr   HEMOGLOBIN A1C WITH EAG   Result Value Ref Range    Hemoglobin A1c 5.3 4.8 - 5.6 %    Estimated average glucose 105 mg/dL   VITAMIN B12 Result Value Ref Range    Vitamin B12 347 211 - 447 pg/mL   METABOLIC PANEL, COMPREHENSIVE   Result Value Ref Range    Glucose 101 (H) 65 - 99 mg/dL    BUN 10 6 - 20 mg/dL    Creatinine 0.90 0.57 - 1.00 mg/dL    GFR est non-AA 82 >59 mL/min/1.73    GFR est AA 94 >59 mL/min/1.73    BUN/Creatinine ratio 11 9 - 23    Sodium 140 134 - 144 mmol/L    Potassium 4.8 3.5 - 5.2 mmol/L    Chloride 101 96 - 106 mmol/L    CO2 20 18 - 29 mmol/L    Calcium 9.4 8.7 - 10.2 mg/dL    Protein, total 7.5 6.0 - 8.5 g/dL    Albumin 4.5 3.5 - 5.5 g/dL    GLOBULIN, TOTAL 3.0 1.5 - 4.5 g/dL    A-G Ratio 1.5 1.2 - 2.2    Bilirubin, total 0.4 0.0 - 1.2 mg/dL    Alk. phosphatase 91 39 - 117 IU/L    AST (SGOT) 14 0 - 40 IU/L    ALT (SGPT) 17 0 - 32 IU/L   CBC WITH AUTOMATED DIFF   Result Value Ref Range    WBC 9.7 3.4 - 10.8 x10E3/uL    RBC 4.16 3.77 - 5.28 x10E6/uL    HGB 13.0 11.1 - 15.9 g/dL    HCT 39.4 34.0 - 46.6 %    MCV 95 79 - 97 fL    MCH 31.3 26.6 - 33.0 pg    MCHC 33.0 31.5 - 35.7 g/dL    RDW 13.1 12.3 - 15.4 %    PLATELET 204 (H) 571 - 379 x10E3/uL    NEUTROPHILS 60 Not Estab. %    Lymphocytes 32 Not Estab. %    MONOCYTES 6 Not Estab. %    EOSINOPHILS 2 Not Estab. %    BASOPHILS 0 Not Estab. %    ABS. NEUTROPHILS 5.8 1.4 - 7.0 x10E3/uL    Abs Lymphocytes 3.1 0.7 - 3.1 x10E3/uL    ABS. MONOCYTES 0.6 0.1 - 0.9 x10E3/uL    ABS. EOSINOPHILS 0.2 0.0 - 0.4 x10E3/uL    ABS. BASOPHILS 0.0 0.0 - 0.2 x10E3/uL    IMMATURE GRANULOCYTES 0 Not Estab. %    ABS. IMM.  GRANS. 0.0 0.0 - 0.1 x10E3/uL   RPR   Result Value Ref Range    RPR Non Reactive Non Reactive   TSH 3RD GENERATION   Result Value Ref Range    TSH 1.540 0.450 - 4.500 uIU/mL   T4, FREE   Result Value Ref Range    T4, Free 1.30 0.82 - 1.77 ng/dL   URINALYSIS W/ RFLX MICROSCOPIC   Result Value Ref Range    Specific Gravity 1.020 1.005 - 1.030    pH (UA) 7.5 5.0 - 7.5    Color Yellow Yellow    Appearance Cloudy (A) Clear    Leukocyte Esterase Negative Negative    Protein Negative Negative/Trace    Glucose Negative Negative    Ketone Negative Negative    Blood Negative Negative    Bilirubin Negative Negative    Urobilinogen 0.2 0.2 - 1.0 mg/dL    Nitrites Negative Negative    Microscopic Examination Comment        Imaging review:  None    Documentation review:  None    Assessment/Plan:   Bear Gonzales is a 45 y.o. female who presented to the neurology office for management of left arm numbness that has been going on intermittently since the end of December. 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 Need to rule out other etiologies such as cervical radiculopathy and multiple sclerosis. Will get MRI of the brain and cervical spine. ICD-10-CM ICD-9-CM    1. Numbness of left hand R20.0 782.0 MRI BRAIN W WO CONT      MRI CERV SPINE W WO CONT   2. Ulnar neuropathy at elbow of left upper extremity G56.22 354.2       Over 25 minutes was spent with the patient and family of which > 50% of the visit was spent counseling on diagnosis, management, and treatment of the diagnosis      Thank you for allowing me to participate in the care of Ms. Ashley Winter. Please feel free to contact me if you have any questions. Erendira Barrios MD  Neurologist    CC: Ludivina Montoya MD  Fax: [phone]    This note was created using voice recognition software. Despite editing, there may be syntax errors. This note will not be viewable in [address]

## 2018-01-25 NOTE — PROCEDURES
[address]  Tel: [phone]  Fax: [phone]  Electrodiagnostic Study Report    Test Date:  2018    Patient: Naty Moran : 1980 Physician: Kelsi Servin M.D.   ID#: [id number] SEX: Male Ref. Phys: Kelsi Servin M.D. Patient History / Exam:  LUE numbness hand & arm, r/o ulnar neuropathy, cervical radiculopathy    EMG & NCV Findings:  Evaluation of the left median motor nerve showed normal distal onset latency (3.2 ms), normal amplitude (6.6 mV), and normal conduction velocity (Elbow-Wrist, 62 m/s). The left ulnar motor nerve showed normal distal onset latency (2.2 ms), normal amplitude (14.2 mV), normal conduction velocity (B Elbow-Wrist, 67 m/s), normal conduction velocity (A Elbow-B Elbow, 56 m/s), normal distal onset latency (2.7 ms), normal amplitude (18.5 mV), normal conduction velocity (B Elbow-Wrist, 74 m/s), and normal conduction velocity (A Elbow-B Elbow, 77 m/s). The left median sensory, the left radial sensory, and the left ulnar sensory nerves showed normal distal peak latency (L2.7, L2.1, L2.8 ms) and normal amplitude (L95.5, L42.0, L54.2 µV). All F Wave latencies were within normal limits. All examined muscles (as indicated in the following table) showed no evidence of electrical instability. Impression: This is an abnormal study. 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      ___________________________  S.  Nadia Trammell M.D.    Nerve Conduction Studies  Anti Sensory Summary Table     Stim Site NR Peak (ms) Norm Peak (ms) P-T Amp (µV) Norm P-T Amp Site1 Site2 Dist (cm)   Left Median Anti Sensory (2nd Digit)  33.5°C   Wrist    2.7 <4 95.5 >13 Wrist 2nd Digit 14.0   Left Radial Anti Sensory (Base 1st Digit)  33.5°C   Wrist    2.1 <2.8 42.0 >11 Wrist Base 1st Digit 10.0   Left Ulnar Anti Sensory (5th Digit)  33.4°C Wrist    2.8 <4.0 54.2 >9 Wrist 5th Digit 14.0     Motor Summary Table     Stim Site NR Onset (ms) Norm Onset (ms) O-P Amp (mV) Norm O-P Amp P-T Amp (mV) Site1 Site2 Dist (cm) Sharan (m/s)   Left Median Motor (Abd Poll Brev)  32°C   Wrist    3.2 <4.5 6.6 >4.1 8.4 Wrist Abd Poll Brev 8.0    Elbow    6.5  6.6  8.8 Elbow Wrist 20.5 62   Left Ulnar Motor Run #1 (Abd Dig Minimi)  33.3°C   Wrist    2.2 <3.1 14.2 >7.0 22.2 Wrist Abd Dig Minimi 8.0    B Elbow    4.5  13.0  20.1 B Elbow Wrist 15.5 67   A Elbow    6.3  12.9  20.3 A Elbow B Elbow 10.0 56   Left Ulnar Motor Run #2 (1st DI)  34°C   Wrist    2.7 <3.1 18.5 >7.0 25.4 Wrist 1st DI 8.0    B Elbow    4.8  17.5  26.1 B Elbow Wrist 15.5 74   A Elbow    6.1  17.6  26.0 A Elbow B Elbow 10.0 77     F Wave Studies     NR F-Lat (ms) Lat Norm (ms) L-R F-Lat (ms) L-R Lat Norm   Left Median (Mrkrs) (Abd Poll Brev)  32.8°C      21.58 <31  <2.2       EMG     Side Muscle Nerve Root Ins Act Fibs Psw Recrt Duration Amp Poly Comment   Left 1stDorInt Ulnar C8-T1 Nml Nml Nml Nml Nml Nml Nml    Left Abd Poll Brev Median C8-T1 Nml Nml Nml Nml Nml Nml Nml    Left PronatorTeres Median C6-7 Nml Nml Nml Nml Nml Nml Nml    Left Biceps Musculocut C5-6 Nml Nml Nml Nml Nml Nml Nml    Left Triceps Radial C6-7-8 Nml Nml Nml Nml Nml Nml Nml    Left Deltoid Axillary C5-6 Nml Nml Nml Nml Nml Nml Nml      Waveforms:

## 2018-01-25 NOTE — MR AVS SNAPSHOT
[address] [facility] 
[phone] Patient: Bal Redman MRN: [id number] Visit Information Date & Time Provider Department Dept. Phone Encounter #  
 1/25/2018  2:15 PM Ayah Ross, [address] [place] [address],[address] [phone] [id number] Follow-up Instructions Return in about 4 weeks (around 2/22/2018) for insomnia . Upcoming Health Maintenance Date Due  
 PAP AKA CERVICAL CYTOLOGY 1/18/2001 DTaP/Tdap/Td series (2 - Td) 3/2/2027 Allergies as of 1/25/2018  Review Complete On: 1/25/2018 By: Whit Mario LPN Severity Noted Reaction Type Reactions Pcn [Penicillins]  06/24/2012    Rash Pork/porcine Containing Products  01/20/2015    Rash Shellfish Containing Products  01/20/2015    Rash  
 Zoloft [Sertraline]  11/16/2017    Other (comments) Dysphoria, insomnia Current Immunizations  Never Reviewed No immunizations on file. Not reviewed this visit You Were Diagnosed With   
  
 Codes Comments Primary insomnia    -  Primary ICD-10-CM: F51.01 
ICD-9-CM: 307.42 Cellulitis of neck     ICD-10-CM: T84.837 ICD-9-CM: 888. 1 Vitals BP Pulse Temp Resp Height(growth percentile) Weight(growth percentile) 127/85 (!) 110 98.3 °F (36.8 °C) (Oral) 18 5' (1.524 m) 188 lb (85.3 kg) LMP SpO2 BMI OB Status Smoking Status 08/08/2016 99% 36.72 kg/m2 Hysterectomy Former Smoker Vitals History BMI and BSA Data Body Mass Index Body Surface Area  
 36.72 kg/m 2 1.9 m 2 Preferred Pharmacy Pharmacy Name Phone [id number] [id number] [phone] Your Updated Medication List  
  
   
This list is accurate as of: 1/25/18  2:55 PM.  Always use your most recent med list.  
  
  
  
  
 benzonatate 200 mg capsule Commonly known as:  TESSALON  
 Take 1 Cap by mouth three (3) times daily as needed for Cough. busPIRone 7.5 mg tablet Commonly known as:  BUSPAR  
TAKE 1 TABLET BY MOUTH THREE TIMES DAILY  
  
 doxycycline 100 mg capsule Commonly known as:  Blondie  Take 1 Cap by mouth two (2) times a day. phentermine 37.5 mg capsule Take 37.5 mg by mouth every morning. traZODone 50 mg tablet Commonly known as:  Vallarie Hemalatha Take 0.5 Tabs by mouth nightly. zolpidem 5 mg tablet Commonly known as:  AMBIEN Take 1 Tab by mouth nightly as needed for Sleep. Max Daily Amount: 5 mg. Prescriptions Printed Refills  
 zolpidem (AMBIEN) 5 mg tablet 0 Sig: Take 1 Tab by mouth nightly as needed for Sleep. Max Daily Amount: 5 mg. Class: Print Route: Oral  
  
Prescriptions Sent to Pharmacy Refills  
 traZODone (DESYREL) 50 mg tablet 1 Sig: Take 0.5 Tabs by mouth nightly. Class: Normal  
 Pharmacy: [facility] [place] [address] Ph #: [phone] Route: Oral  
 doxycycline (MONODOX) 100 mg capsule 0 Sig: Take 1 Cap by mouth two (2) times a day. Class: Normal  
 Pharmacy: [facility]pharm[facility] [address] [place] [address] Ph #: [phone] Route: Oral  
  
Follow-up Instructions Return in about 4 weeks (around 2018) for insomnia . Patient Instructions Cellulitis: Care Instructions Your Care Instructions Cellulitis is a skin infection. It often occurs after a break in the skin from a scrape, cut, bite, or puncture, or after a rash. The doctor has checked you carefully, but problems can develop later. If you notice any problems or new symptoms, get medical treatment right away. Follow-up care is a key part of your treatment and safety.  Be sure to make and go to all appointments, and call your doctor if you are having problems. It's also a good idea to know your test results and keep a list of the medicines you take. How can you care for yourself at home? · Take your antibiotics as directed. Do not stop taking them just because you feel better. You need to take the full course of antibiotics. · Prop up the infected area on pillows to reduce pain and swelling. Try to keep the area above the level of your heart as often as you can. · If your doctor told you how to care for your wound, follow your doctor's instructions. If you did not get instructions, follow this general advice: ¨ Wash the wound with clean water 2 times a day. Don't use hydrogen peroxide or alcohol, which can slow healing. ¨ You may cover the wound with a thin layer of petroleum jelly, such as Vaseline, and a nonstick bandage. ¨ Apply more petroleum jelly and replace the bandage as needed. · Be safe with medicines. Take pain medicines exactly as directed. ¨ If the doctor gave you a prescription medicine for pain, take it as prescribed. ¨ If you are not taking a prescription pain medicine, ask your doctor if you can take an over-the-counter medicine. To prevent cellulitis in the future · Try to prevent cuts, scrapes, or other injuries to your skin. Cellulitis most often occurs where there is a break in the skin. · If you get a scrape, cut, mild burn, or bite, wash the wound with clean water as soon as you can to help avoid infection. Don't use hydrogen peroxide or alcohol, which can slow healing. · If you have swelling in your legs (edema), support stockings and good skin care may help prevent leg sores and cellulitis. · Take care of your feet, especially if you have diabetes or other conditions that increase the risk of infection. Wear shoes and socks. Do not go barefoot. If you have athlete's foot or other skin problems on your feet, talk to your doctor about how to treat them. When should you call for help? Call your doctor now or seek immediate medical care if: 
? · You have signs that your infection is getting worse, such as: 
¨ Increased pain, swelling, warmth, or redness. ¨ Red streaks leading from the area. ¨ Pus draining from the area. ¨ A fever. ? · You get a rash. ? Watch closely for changes in your health, and be sure to contact your doctor if: 
? · You are not getting better after 1 day (24 hours). ? · You do not get better as expected. Where can you learn more? Go to http://mansoor-eugene.info/. Brendan Skates in the search box to learn more about \"Cellulitis: Care Instructions. \" Current as of: October 13, 2016 Content Version: 11.4 © 7869-7877 [employer]. Care instructions adapted under license by [employer] (which disclaims liability or warranty for this information). If you have questions about a medical condition or this instruction, always ask your healthcare professional. [place] [address] any warranty or liability for your use of this information. I suspect the phentermine is contributing to your difficulty sleeping try stopping for a few days Introducing [facility]! Dear Julián Bird: 
Thank you for requesting a [employer] account. Our records indicate that you already have an active [employer] account. You can access your account anytime at [email or web address]. [employer] Did you know that you can access your hospital and ER discharge instructions at any time in [employer]? You can also review all of your test results from your hospital stay or ER visit. Additional Information If you have questions, please visit the Frequently Asked Questions section of the [employer] website at [email or web address] [email or web address]/. Remember, [employer] is NOT to be used for urgent needs. For medical emergencies, dial 911. Now available from your iPhone and Android! Please provide this summary of care documentation to your next provider. Your primary care clinician is listed as Manny Cox. If you have any questions after today's visit, please call [phone].

## 2018-01-25 NOTE — LETTER
2018 1:50 PM 
 
Patient:    Alex Ulloa YOB: 1980 Date of Visit:    2018 Dear Kennedy Newman MD 
 
Thank you for referring Ms. Alex Ulloa to me for evaluation/treatment. Below are the relevant portions of my assessment and plan of care. Neurology Note REFERRED BY: 
Kennedy Newman MD 
 
18 Chief complaint: Left arm numbness SUBJECTIVE: 
 
Alex Ulloa is a 45 y.o. female who presented to the neurology office for management of left arm numbness. The numbness has been going on since the end of 2017 and it is off and on. It does involve the medial 4 fingers of the left arm. She does also complain of pain around her left elbow. She has been using an elbow splint and that is helping her some. She does have urinary urgency. She denies any visual changes or any other focal neurological symptoms in the past. 
 
EMG/nerve conduction study done today is normal.  Symptoms are about the same as last visit. Current Outpatient Prescriptions Medication Sig  
 busPIRone (BUSPAR) 7.5 mg tablet TAKE 1 TABLET BY MOUTH THREE TIMES DAILY  benzonatate (TESSALON) 200 mg capsule Take 1 Cap by mouth three (3) times daily as needed for Cough.  phentermine 37.5 mg capsule Take 37.5 mg by mouth every morning. No current facility-administered medications for this visit. Allergies Allergen Reactions  Pcn [Penicillins] Rash  Pork/Porcine Containing Products Rash  Shellfish Containing Products Rash  Zoloft [Sertraline] Other (comments) Dysphoria, insomnia Past Medical History:  
Diagnosis Date  Chronic pain   
 low back  Uterine fibroid Past Surgical History:  
Procedure Laterality Date  HX  SECTION    HX  SECTION    HX OTHER SURGICAL    
 WISDOM TEETH EXTRACTION  
 HX TUBAL LIGATION    
 MYOMECT[facility] 1[facility]    
 fibroid removal by Dr. Linnette Lee Family History Problem Relation Age of Onset  Diabetes Mother  Asthma Mother  Elevated Lipids Mother  Liver Disease Father Mikael Thomas  Alcohol abuse Father  No Known Problems Son  No Known Problems Son   
[facility] Other Sister PEPTIC ULCER  
 Diabetes Maternal Aunt  Diabetes Maternal Grandmother  Other Other NO ANESTHESIA PROBLEMS IN FAMILY  Anesth Problems Neg Hx Social History Substance Use Topics  Smoking status: Former Smoker Packs/day: 0.25 Years: 15.00 Types: Cigarettes Quit date: 1/3/2017  Smokeless tobacco: Current User Comment: GIVEN \"STOP SMOKING\" HANDOUT.  Alcohol use No  
 
 
REVIEW OF SYSTEMS:  
A ten system review of constitutional, cardiovascular, respiratory, musculoskeletal, endocrine, skin, SHEENT, genitourinary, psychiatric and neurologic systems was obtained and is unremarkable with the exception of anxiety, constipation, cough, depression, muscle pain, muscle weakness, poor appetite and snoring EXAMINATION:  
Visit Vitals  /79 (BP 1 Location: Right arm, BP Patient Position: Sitting)  Pulse 93  LMP 08/08/2016  SpO2 100% General:  
General appearance: Pt is in no acute distress Distal pulses are preserved Neurological Examination:  
Mental Status: AAO x3. Speech is fluent. Follows commands, has normal fund of knowledge, attention, short term recall, comprehension and insight. Cranial Nerves: Visual fields are full. PERRL, Extraocular movements are full. Facial sensation intact V1- V3. Facial movement intact, symmetric. Hearing intact to conversation. Palate elevates symmetrically. Shoulder shrug symmetric. Tongue midline. Motor: Strength is 5/5 in all 4 ext. No atrophy. Tone: Normal 
 
Sensation: Normal to light touch Coordination/Cerebellar: Intact to finger-nose-finger Gait: casual gait is normal.  
 
Skin: No significant bruising or lacerations. Laboratory review:  
Results for orders placed or performed in visit on 11/13/17 SED RATE (ESR) Result Value Ref Range Sed rate (ESR) 16 0 - 32 mm/hr HEMOGLOBIN A1C WITH EAG Result Value Ref Range Hemoglobin A1c 5.3 4.8 - 5.6 % Estimated average glucose 105 mg/dL VITAMIN B12 Result Value Ref Range Vitamin B12 347 211 - 946 pg/mL METABOLIC PANEL, COMPREHENSIVE Result Value Ref Range Glucose 101 (H) 65 - 99 mg/dL BUN 10 6 - 20 mg/dL Creatinine 0.90 0.57 - 1.00 mg/dL GFR est non-AA 82 >59 mL/min/1.73 GFR est AA 94 >59 mL/min/1.73  
 BUN/Creatinine ratio 11 9 - 23 Sodium 140 134 - 144 mmol/L Potassium 4.8 3.5 - 5.2 mmol/L Chloride 101 96 - 106 mmol/L  
 CO2 20 18 - 29 mmol/L Calcium 9.4 8.7 - 10.2 mg/dL Protein, total 7.5 6.0 - 8.5 g/dL Albumin 4.5 3.5 - 5.5 g/dL GLOBULIN, TOTAL 3.0 1.5 - 4.5 g/dL A-G Ratio 1.5 1.2 - 2.2 Bilirubin, total 0.4 0.0 - 1.2 mg/dL Alk. phosphatase 91 39 - 117 IU/L  
 AST (SGOT) 14 0 - 40 IU/L  
 ALT (SGPT) 17 0 - 32 IU/L  
CBC WITH AUTOMATED DIFF Result Value Ref Range WBC 9.7 3.4 - 10.8 x10E3/uL  
 RBC 4.16 3.77 - 5.28 x10E6/uL HGB 13.0 11.1 - 15.9 g/dL HCT 39.4 34.0 - 46.6 % MCV 95 79 - 97 fL  
 MCH 31.3 26.6 - 33.0 pg  
 MCHC 33.0 31.5 - 35.7 g/dL  
 RDW 13.1 12.3 - 15.4 % PLATELET 606 (H) 406 - 379 x10E3/uL NEUTROPHILS 60 Not Estab. % Lymphocytes 32 Not Estab. % MONOCYTES 6 Not Estab. % EOSINOPHILS 2 Not Estab. % BASOPHILS 0 Not Estab. %  
 ABS. NEUTROPHILS 5.8 1.4 - 7.0 x10E3/uL Abs Lymphocytes 3.1 0.7 - 3.1 x10E3/uL  
 ABS. MONOCYTES 0.6 0.1 - 0.9 x10E3/uL  
 ABS. EOSINOPHILS 0.2 0.0 - 0.4 x10E3/uL  
 ABS. BASOPHILS 0.0 0.0 - 0.2 x10E3/uL IMMATURE GRANULOCYTES 0 Not Estab. %  
 ABS. IMM. GRANS. 0.0 0.0 - 0.1 x10E3/uL  
RPR Result Value Ref Range RPR Non Reactive Non Reactive TSH 3RD GENERATION Result Value Ref Range TSH 1.540 0.450 - 4.500 uIU/mL T4, FREE  
 Result Value Ref Range T4, Free 1.30 0.82 - 1.77 ng/dL URINALYSIS W/ RFLX MICROSCOPIC Result Value Ref Range Specific Gravity 1.020 1.005 - 1.030  
 pH (UA) 7.5 5.0 - 7.5 Color Yellow Yellow Appearance Cloudy (A) Clear Leukocyte Esterase Negative Negative Protein Negative Negative/Trace Glucose Negative Negative Ketone Negative Negative Blood Negative Negative Bilirubin Negative Negative Urobilinogen 0.2 0.2 - 1.0 mg/dL Nitrites Negative Negative Microscopic Examination Comment Imaging review: 
None Documentation review: 
None Assessment/Plan:  
Melissa Singleton is a 45 y.o. female who presented to the neurology office for management of left arm numbness that has been going on intermittently since the end of December. 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 Need to rule out other etiologies such as cervical radiculopathy and multiple sclerosis. Will get MRI of the brain and cervical spine. ICD-10-CM ICD-9-CM 1. Numbness of left hand R20.0 782.0 MRI BRAIN W WO CONT  
   MRI CERV SPINE W WO CONT  
2. Ulnar neuropathy at elbow of left upper extremity G56.22 354.2 Over 25 minutes was spent with the patient and family of which > 50% of the visit was spent counseling on diagnosis, management, and treatment of the diagnosis Thank you for allowing me to participate in the care of Ms. Venice Monterroso. Please feel free to contact me if you have any questions. Catie Molina MD 
Neurologist 
 
CC: Hazel Chamorro MD 
Fax: [phone] This note was created using voice recognition software. Despite editing, there may be syntax errors. This note will not be viewable in [address] If you have questions, please do not hesitate to call me.   I look forward to following Ms. Bassem Westfallkaur along with you. Sincerely, MD Vanesa Castro [facility] Neurology [address], [address] SISTER [facility], [address] [facility] Tel: [phone]  Fax: [phone] Electrodiagnostic Study Report Test Date:  2018 Patient: Alek Nguyen : 1980 Physician: Basim Perez M.D. ID#: [id number] SEX: Male Ref. Phys: Basim Perez M.D. Patient History / Exam: LUE numbness hand & arm, r/o ulnar neuropathy, cervical radiculopathy EMG & NCV Findings: 
Evaluation of the left median motor nerve showed normal distal onset latency (3.2 ms), normal amplitude (6.6 mV), and normal conduction velocity (Elbow-Wrist, 62 m/s). The left ulnar motor nerve showed normal distal onset latency (2.2 ms), normal amplitude (14.2 mV), normal conduction velocity (B Elbow-Wrist, 67 m/s), normal conduction velocity (A Elbow-B Elbow, 56 m/s), normal distal onset latency (2.7 ms), normal amplitude (18.5 mV), normal conduction velocity (B Elbow-Wrist, 74 m/s), and normal conduction velocity (A Elbow-B Elbow, 77 m/s). The left median sensory, the left radial sensory, and the left ulnar sensory nerves showed normal distal peak latency (L2.7, L2.1, L2.8 ms) and normal amplitude (L95.5, L42.0, L54.2 µV). All F Wave latencies were within normal limits. All examined muscles (as indicated in the following table) showed no evidence of electrical instability. Impression: This is an abnormal study. 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 
 
 
___________________________ S. Elisabeth Barrios M.D. 
 
Nerve Conduction Studies Anti Sensory Summary Table Stim Site NR Peak (ms) Norm Peak (ms) P-T Amp (µV) Norm P-T Amp Site1 Site2 Dist (cm) Left Median Anti Sensory (2nd Digit)  33.5°C Wrist    2.7 <4 95.5 >13 Wrist 2nd Digit 14.0 Left Radial Anti Sensory (Base 1st Digit)  33.5°C  
 Wrist    2.1 <2.8 42.0 >11 Wrist Base 1st Digit 10.0 Left Ulnar Anti Sensory (5th Digit)  33.4°C Wrist    2.8 <4.0 54.2 >9 Wrist 5th Digit 14.0 Motor Summary Table Stim Site NR Onset (ms) Norm Onset (ms) O-P Amp (mV) Norm O-P Amp P-T Amp (mV) Site1 Site2 Dist (cm) Sharan (m/s) Left Median Motor (Abd Poll Brev)  32°C Wrist    3.2 <4.5 6.6 >4.1 8.4 Wrist Abd Poll Brev 8.0 Elbow    6.5  6.6  8.8 Elbow Wrist 20.5 62 Left Ulnar Motor Run #1 (Abd Dig Minimi)  33.3°C Wrist    2.2 <3.1 14.2 >7.0 22.2 Wrist Abd Dig Minimi 8.0 B Elbow    4.5  13.0  20.1 B Elbow Wrist 15.5 67 A Elbow    6.3  12.9  20.3 A Elbow B Elbow 10.0 56 Left Ulnar Motor Run #2 (1st DI)  34°C Wrist    2.7 <3.1 18.5 >7.0 25.4 Wrist 1st DI 8.0 B Elbow    4.8  17.5  26.1 B Elbow Wrist 15.5 74 A Elbow    6.1  17.6  26.0 A Elbow B Elbow 10.0 77 F Wave Studies NR F-Lat (ms) Lat Norm (ms) L-R F-Lat (ms) L-R Lat Norm Left Median (Mrkrs) (Abd Poll Brev)  32.8°C  
   21.58 <31  <2.2 EMG Side Muscle Nerve Root Ins Act Fibs Psw Recrt Duration Amp Poly Comment Left 1stDorInt Ulnar C8-T1 Nml Nml Nml Nml Nml Nml Nml Left Abd Poll Brev Median C8-T1 Nml Nml Nml Nml Nml Nml Nml Left PronatorTeres Median C6-7 Nml Nml Nml Nml Nml Nml Nml Left Biceps Musculocut C5-6 Nml Nml Nml Nml Nml Nml Nml Left Triceps Radial C6-7-8 Nml Nml Nml Nml Nml Nml Nml Left Deltoid Axillary C5-6 Nml Nml Nml Nml Nml Nml Nml Waveforms:

## 2018-01-25 NOTE — PROGRESS NOTES
CC: Other (swollen lymph nodes behind ears) and Insomnia      HPI:    She is a 45 y.o. female who presents for evaluation of     During appointment with Dr Zen Yarbrough patient was advised to Pt was advised to try OTC Melatonin for management of insomnia which she tried and has not helped. Has also tried taking benadryl and unisom. Struggling with falling asleep and staying asleep.  gets up at 11: 39 AM and that is when patient is starting to fall asleep. Taking care of 3 yo and 2 yo ( kids)     On phentermine which I discussed is likely exacerbating sleeping issues. Already had difficulty falling asleep prior to phentermine  On Buspar for anxiety    Noted 4 days ago right neck discomfort and a bump on the occipital area on the right, denies fever and chills      ROS:  10 systems reviewed and negative other than HPI    Past Medical History:   Diagnosis Date    Chronic pain     low back    Uterine fibroid        Current Outpatient Prescriptions on File Prior to Visit   Medication Sig Dispense Refill    busPIRone (BUSPAR) 7.5 mg tablet TAKE 1 TABLET BY MOUTH THREE TIMES DAILY 90 Tab 0    phentermine 37.5 mg capsule Take 37.5 mg by mouth every morning.  benzonatate (TESSALON) 200 mg capsule Take 1 Cap by mouth three (3) times daily as needed for Cough. 30 Cap 0     No current facility-administered medications on file prior to visit.         Past Surgical History:   Procedure Laterality Date    HX  SECTION      HX  SECTION      HX OTHER SURGICAL      WISDOM TEETH EXTRACTION    HX TUBAL LIGATION      MYOMECTOMY 1-4,W/TOT 250GMS/<, [facility] [place] [facility]      fibroid removal by Dr. Bambi Salinas       Family History   Problem Relation Age of Onset    Diabetes Mother     Asthma Mother     Elevated Lipids Mother     Liver Disease Father      CHIRROSIS    Alcohol abuse Father     No Known Problems Son     No Known Problems Son     Other Sister      PEPTIC ULCER    Diabetes Maternal Aunt     Diabetes Maternal Grandmother     Other Other      NO ANESTHESIA PROBLEMS IN FAMILY    Anesth Problems Neg Hx      Reviewed and no changes     Social History     Social History    Marital status:      Spouse name: N/A    Number of children: N/A    Years of education: N/A     Occupational History    Not on file. Social History Main Topics    Smoking status: Former Smoker     Packs/day: 0.25     Years: 15.00     Types: Cigarettes     Quit date: 1/3/2017    Smokeless tobacco: Current User      Comment: GIVEN \"STOP SMOKING\" HANDOUT.    Alcohol use No    Drug use: No    Sexual activity: Yes     Partners: Male     Birth control/ protection: Surgical      Comment:  has 2 young children     Other Topics Concern    Not on file     Social History Narrative            Visit Vitals    /85    Pulse (!) 110    Temp 98.3 °F (36.8 °C) (Oral)    Resp 18    Ht 5' (1.524 m)    Wt 188 lb (85.3 kg)    LMP 08/08/2016    SpO2 99%    BMI 36.72 kg/m2       Physical Examination:   General - Well appearing female  HEENT - PERRL, TM no erythema/opacification, normal nasal turbinates, oropharynx no erythema or exudate, MMM  Neck - supple, no bruits, no TMG, no LAD  Pulm - clear to auscultation bilaterally  Cardio - RRR, normal S1 S2, no murmur gallops or rubs  Abd - soft, nontender, no masses, no HSM  Extrem - no edema, +2 distal pulses  Psych - normal affect, appropriate mood  Skin: Right occipital area with an area of redness and tenderness with a central scab noted warmth also.   Lymph nodes s surrounding area are swollen and tender    Lab Results   Component Value Date/Time    WBC 9.7 11/13/2017 04:21 PM    HGB 13.0 11/13/2017 04:21 PM    HCT 39.4 11/13/2017 04:21 PM    PLATELET 810 60/81/7107 04:21 PM    MCV 95 11/13/2017 04:21 PM     Lab Results   Component Value Date/Time    Sodium 140 11/13/2017 04:21 PM    Potassium 4.8 11/13/2017 04:21 PM    Chloride 101 11/13/2017 04:21 PM    CO2 20 11/13/2017 04:21 PM    Anion gap 8 09/07/2016 05:33 AM    Glucose 101 11/13/2017 04:21 PM    BUN 10 11/13/2017 04:21 PM    Creatinine 0.90 11/13/2017 04:21 PM    BUN/Creatinine ratio 11 11/13/2017 04:21 PM    GFR est AA 94 11/13/2017 04:21 PM    GFR est non-AA 82 11/13/2017 04:21 PM    Calcium 9.4 11/13/2017 04:21 PM     Lab Results   Component Value Date/Time    Cholesterol, total 171 04/21/2016 12:43 PM    HDL Cholesterol 47 04/21/2016 12:43 PM    LDL, calculated 91 04/21/2016 12:43 PM    VLDL, calculated 33 04/21/2016 12:43 PM    Triglyceride 164 04/21/2016 12:43 PM     Lab Results   Component Value Date/Time    TSH 1.540 11/13/2017 04:21 PM     No results found for: PSA, PSA2, PSAR1, Veronica Simpson, [id number], [id number], PSALT  Lab Results   Component Value Date/Time    Hemoglobin A1c 5.3 11/13/2017 04:21 PM     Lab Results   Component Value Date/Time    VITAMIN D, 25-HYDROXY 14.1 04/21/2016 12:43 PM       Lab Results   Component Value Date/Time    ALT (SGPT) 17 11/13/2017 04:21 PM    AST (SGOT) 14 11/13/2017 04:21 PM    Alk. phosphatase 91 11/13/2017 04:21 PM    Bilirubin, total 0.4 11/13/2017 04:21 PM           Assessment/Plan:    1. Primary insomnia  -Patient has tried over-the-counter remedies including mannitol and Benadryl and Unisom. I suspect that phentermine is contributing to her insomnia. I advised her to stop the phentermine for a week and see if insomnia improves. - anxiety is contributing to insomnia  -Advised to try trazodone. Given 10 pills of Ambien for nights when she absolutely cannot fall asleep discussed that this is habit-forming and should not be used frequently  \" traZODone (DESYREL) 50 mg tablet; Take 0.5 Tabs by mouth nightly. Dispense: 30 Tab; Refill: 1  - zolpidem (AMBIEN) 5 mg tablet; Take 1 Tab by mouth nightly as needed for Sleep. Max Daily Amount: 5 mg. Dispense: 10 Tab; Refill: 0    2. Cellulitis of neck  - doxycycline (MONODOX) 100 mg capsule;  Take 1 Cap by mouth two (2) times a day. Dispense: 20 Cap; Refill: 0  Patient will follow up with Dr Geovani Al     Follow-up Disposition:  Return in about 4 weeks (around 2/22/2018) for insomnia .     Adrienne Mesa MD

## 2018-01-25 NOTE — PATIENT INSTRUCTIONS
Cellulitis: Care Instructions  Your Care Instructions    Cellulitis is a skin infection. It often occurs after a break in the skin from a scrape, cut, bite, or puncture, or after a rash. The doctor has checked you carefully, but problems can develop later. If you notice any problems or new symptoms, get medical treatment right away. Follow-up care is a key part of your treatment and safety. Be sure to make and go to all appointments, and call your doctor if you are having problems. It's also a good idea to know your test results and keep a list of the medicines you take. How can you care for yourself at home? · Take your antibiotics as directed. Do not stop taking them just because you feel better. You need to take the full course of antibiotics. · Prop up the infected area on pillows to reduce pain and swelling. Try to keep the area above the level of your heart as often as you can. · If your doctor told you how to care for your wound, follow your doctor's instructions. If you did not get instructions, follow this general advice:  ¨ Wash the wound with clean water 2 times a day. Don't use hydrogen peroxide or alcohol, which can slow healing. ¨ You may cover the wound with a thin layer of petroleum jelly, such as Vaseline, and a nonstick bandage. ¨ Apply more petroleum jelly and replace the bandage as needed. · Be safe with medicines. Take pain medicines exactly as directed. ¨ If the doctor gave you a prescription medicine for pain, take it as prescribed. ¨ If you are not taking a prescription pain medicine, ask your doctor if you can take an over-the-counter medicine. To prevent cellulitis in the future  · Try to prevent cuts, scrapes, or other injuries to your skin. Cellulitis most often occurs where there is a break in the skin. · If you get a scrape, cut, mild burn, or bite, wash the wound with clean water as soon as you can to help avoid infection.  Don't use hydrogen peroxide or alcohol, which can slow healing. · If you have swelling in your legs (edema), support stockings and good skin care may help prevent leg sores and cellulitis. · Take care of your feet, especially if you have diabetes or other conditions that increase the risk of infection. Wear shoes and socks. Do not go barefoot. If you have athlete's foot or other skin problems on your feet, talk to your doctor about how to treat them. When should you call for help? Call your doctor now or seek immediate medical care if:  ? · You have signs that your infection is getting worse, such as:  ¨ Increased pain, swelling, warmth, or redness. ¨ Red streaks leading from the area. ¨ Pus draining from the area. ¨ A fever. ? · You get a rash. ? Watch closely for changes in your health, and be sure to contact your doctor if:  ? · You are not getting better after 1 day (24 hours). ? · You do not get better as expected. Where can you learn more? Go to http://mansoor-eugene.info/. Mabel Mckeon in the search box to learn more about \"Cellulitis: Care Instructions. \"  Current as of: October 13, 2016  Content Version: 11.4  © 2642-1699 [employer]. Care instructions adapted under license by [employer] (which disclaims liability or warranty for this information). If you have questions about a medical condition or this instruction, always ask your healthcare professional. [place] [address] any warranty or liability for your use of this information.       I suspect the phentermine is contributing to your difficulty sleeping try stopping for a few days

## 2018-02-02 ENCOUNTER — HOSPITAL ENCOUNTER (EMERGENCY)
Age: 38
Discharge: HOME | End: 2018-02-02
Attending: EMERGENCY MEDICINE
Payer: COMMERCIAL

## 2018-02-02 ENCOUNTER — APPOINTMENT (OUTPATIENT)
Dept: GENERAL RADIOLOGY | Age: 38
End: 2018-02-02
Attending: EMERGENCY MEDICINE
Payer: COMMERCIAL

## 2018-02-02 ENCOUNTER — APPOINTMENT (OUTPATIENT)
Dept: MRI IMAGING | Age: 38
End: 2018-02-02
Attending: EMERGENCY MEDICINE
Payer: COMMERCIAL

## 2018-02-02 VITALS
TEMPERATURE: 97.7 F | DIASTOLIC BLOOD PRESSURE: 69 MMHG | HEART RATE: 107 BPM | BODY MASS INDEX: 36.96 KG/M2 | WEIGHT: 188.27 LBS | HEIGHT: 60 IN | OXYGEN SATURATION: 99 % | RESPIRATION RATE: 12 BRPM | SYSTOLIC BLOOD PRESSURE: 110 MMHG

## 2018-02-02 DIAGNOSIS — M50.30 DDD (DEGENERATIVE DISC DISEASE), CERVICAL: Primary | ICD-10-CM

## 2018-02-02 DIAGNOSIS — M54.10 RADICULAR PAIN: ICD-10-CM

## 2018-02-02 LAB
ALBUMIN SERPL-MCNC: 3.5 G/DL (ref 3.5–5)
ALBUMIN/GLOB SERPL: 0.9 {RATIO} (ref 1.1–2.2)
ALP SERPL-CCNC: 80 U/L (ref 45–117)
ALT SERPL-CCNC: 28 U/L (ref 12–78)
ANION GAP SERPL CALC-SCNC: 7 MMOL/L (ref 5–15)
AST SERPL-CCNC: 15 U/L (ref 15–37)
BASOPHILS # BLD: 0 K/UL (ref 0–0.1)
BASOPHILS NFR BLD: 0 % (ref 0–1)
BILIRUB SERPL-MCNC: 0.5 MG/DL (ref 0.2–1)
BUN SERPL-MCNC: 5 MG/DL (ref 6–20)
BUN/CREAT SERPL: 7 (ref 12–20)
CALCIUM SERPL-MCNC: 8.8 MG/DL (ref 8.5–10.1)
CHLORIDE SERPL-SCNC: 106 MMOL/L (ref 97–108)
CO2 SERPL-SCNC: 25 MMOL/L (ref 21–32)
CREAT SERPL-MCNC: 0.68 MG/DL (ref 0.55–1.02)
D DIMER PPP FEU-MCNC: 0.32 MG/L FEU (ref 0–0.65)
DIFFERENTIAL METHOD BLD: NORMAL
EOSINOPHIL # BLD: 0.1 K/UL (ref 0–0.4)
EOSINOPHIL NFR BLD: 1 % (ref 0–7)
ERYTHROCYTE [DISTWIDTH] IN BLOOD BY AUTOMATED COUNT: 12.3 % (ref 11.5–14.5)
GLOBULIN SER CALC-MCNC: 3.9 G/DL (ref 2–4)
GLUCOSE SERPL-MCNC: 101 MG/DL (ref 65–100)
HCT VFR BLD AUTO: 36.7 % (ref 35–47)
HGB BLD-MCNC: 12 G/DL (ref 11.5–16)
IMM GRANULOCYTES # BLD: 0 K/UL (ref 0–0.04)
IMM GRANULOCYTES NFR BLD AUTO: 0 % (ref 0–0.5)
LYMPHOCYTES # BLD: 2.5 K/UL (ref 0.8–3.5)
LYMPHOCYTES NFR BLD: 24 % (ref 12–49)
MCH RBC QN AUTO: 31 PG (ref 26–34)
MCHC RBC AUTO-ENTMCNC: 32.7 G/DL (ref 30–36.5)
MCV RBC AUTO: 94.8 FL (ref 80–99)
MONOCYTES # BLD: 0.5 K/UL (ref 0–1)
MONOCYTES NFR BLD: 5 % (ref 5–13)
NEUTS SEG # BLD: 7 K/UL (ref 1.8–8)
NEUTS SEG NFR BLD: 69 % (ref 32–75)
NRBC # BLD: 0 K/UL (ref 0–0.01)
NRBC BLD-RTO: 0 PER 100 WBC
PLATELET # BLD AUTO: 359 K/UL (ref 150–400)
PMV BLD AUTO: 10.7 FL (ref 8.9–12.9)
POTASSIUM SERPL-SCNC: 4.1 MMOL/L (ref 3.5–5.1)
PROT SERPL-MCNC: 7.4 G/DL (ref 6.4–8.2)
RBC # BLD AUTO: 3.87 M/UL (ref 3.8–5.2)
SODIUM SERPL-SCNC: 138 MMOL/L (ref 136–145)
TROPONIN I SERPL-MCNC: <0.04 NG/ML
WBC # BLD AUTO: 10.2 K/UL (ref 3.6–11)

## 2018-02-02 PROCEDURE — 80053 COMPREHEN METABOLIC PANEL: CPT | Performed by: EMERGENCY MEDICINE

## 2018-02-02 PROCEDURE — 96372 THER/PROPH/DIAG INJ SC/IM: CPT

## 2018-02-02 PROCEDURE — 71046 X-RAY EXAM CHEST 2 VIEWS: CPT

## 2018-02-02 PROCEDURE — 93005 ELECTROCARDIOGRAM TRACING: CPT

## 2018-02-02 PROCEDURE — 85025 COMPLETE CBC W/AUTO DIFF WBC: CPT | Performed by: EMERGENCY MEDICINE

## 2018-02-02 PROCEDURE — 74011250636 HC RX REV CODE- 250/636: Performed by: EMERGENCY MEDICINE

## 2018-02-02 PROCEDURE — 36415 COLL VENOUS BLD VENIPUNCTURE: CPT | Performed by: EMERGENCY MEDICINE

## 2018-02-02 PROCEDURE — 74011250636 HC RX REV CODE- 250/636

## 2018-02-02 PROCEDURE — 85379 FIBRIN DEGRADATION QUANT: CPT | Performed by: EMERGENCY MEDICINE

## 2018-02-02 PROCEDURE — 99283 EMERGENCY DEPT VISIT LOW MDM: CPT

## 2018-02-02 PROCEDURE — 72156 MRI NECK SPINE W/O & W/DYE: CPT

## 2018-02-02 PROCEDURE — 70553 MRI BRAIN STEM W/O & W/DYE: CPT

## 2018-02-02 PROCEDURE — 84484 ASSAY OF TROPONIN QUANT: CPT | Performed by: EMERGENCY MEDICINE

## 2018-02-02 PROCEDURE — A9576 INJ PROHANCE MULTIPACK: HCPCS | Performed by: EMERGENCY MEDICINE

## 2018-02-02 PROCEDURE — 74011250637 HC RX REV CODE- 250/637: Performed by: EMERGENCY MEDICINE

## 2018-02-02 RX ORDER — CYCLOBENZAPRINE HCL 10 MG
10 TABLET ORAL
Qty: 15 TAB | Refills: 0 | Status: SHIPPED | OUTPATIENT
Start: 2018-02-02 | End: 2018-02-07 | Stop reason: SDUPTHER

## 2018-02-02 RX ORDER — HYDROCODONE BITARTRATE AND ACETAMINOPHEN 5; 325 MG/1; MG/1
1 TABLET ORAL
Qty: 15 TAB | Refills: 0 | Status: SHIPPED | OUTPATIENT
Start: 2018-02-02 | End: 2018-02-13

## 2018-02-02 RX ORDER — CYCLOBENZAPRINE HCL 10 MG
10 TABLET ORAL
Status: COMPLETED | OUTPATIENT
Start: 2018-02-02 | End: 2018-02-02

## 2018-02-02 RX ORDER — OXYCODONE HYDROCHLORIDE 5 MG/1
5 TABLET ORAL
Status: COMPLETED | OUTPATIENT
Start: 2018-02-02 | End: 2018-02-02

## 2018-02-02 RX ORDER — KETOROLAC TROMETHAMINE 30 MG/ML
INJECTION, SOLUTION INTRAMUSCULAR; INTRAVENOUS
Status: COMPLETED
Start: 2018-02-02 | End: 2018-02-02

## 2018-02-02 RX ORDER — METHYLPREDNISOLONE 4 MG/1
TABLET ORAL
Qty: 1 DOSE PACK | Refills: 0 | Status: SHIPPED | OUTPATIENT
Start: 2018-02-02 | End: 2018-02-13

## 2018-02-02 RX ORDER — SODIUM CHLORIDE 0.9 % (FLUSH) 0.9 %
5-10 SYRINGE (ML) INJECTION EVERY 8 HOURS
Status: DISCONTINUED | OUTPATIENT
Start: 2018-02-02 | End: 2018-02-03 | Stop reason: HOSPADM

## 2018-02-02 RX ORDER — SODIUM CHLORIDE 0.9 % (FLUSH) 0.9 %
5-10 SYRINGE (ML) INJECTION AS NEEDED
Status: DISCONTINUED | OUTPATIENT
Start: 2018-02-02 | End: 2018-02-03 | Stop reason: HOSPADM

## 2018-02-02 RX ORDER — KETOROLAC TROMETHAMINE 30 MG/ML
15 INJECTION, SOLUTION INTRAMUSCULAR; INTRAVENOUS
Status: COMPLETED | OUTPATIENT
Start: 2018-02-02 | End: 2018-02-02

## 2018-02-02 RX ADMIN — GADOTERIDOL 20 ML: 279.3 INJECTION, SOLUTION INTRAVENOUS at 18:59

## 2018-02-02 RX ADMIN — CYCLOBENZAPRINE HYDROCHLORIDE 10 MG: 10 TABLET, FILM COATED ORAL at 16:37

## 2018-02-02 RX ADMIN — KETOROLAC TROMETHAMINE 15 MG: 30 INJECTION, SOLUTION INTRAMUSCULAR; INTRAVENOUS at 17:50

## 2018-02-02 RX ADMIN — KETOROLAC TROMETHAMINE 15 MG: 30 INJECTION, SOLUTION INTRAMUSCULAR at 17:50

## 2018-02-02 RX ADMIN — OXYCODONE HYDROCHLORIDE 5 MG: 5 TABLET ORAL at 16:37

## 2018-02-02 NOTE — ED PROVIDER NOTES
EMERGENCY DEPARTMENT HISTORY AND PHYSICAL EXAM      Date: 2/2/2018  Patient Name: Sonny Benitez    History of Presenting Illness     Chief Complaint   Patient presents with    Arm Pain     LEFT arm since this past summer, sees a neurologist for \"nerve issues\" iin LEFT arm, scheduled for MRI on Monday, pt states she is very worried about the results, pain is poorly controlled. Pt is tearful. History Provided By: Patient    HPI: Sonny Benitez, 45 y.o. female with PMHx significant for chronic low back pain, presents ambulatory to the ED with cc of continued LUE and L shoulder pain occasionally radiating into L chest x August 2017. There are no modifying factors. Pt notes that she is followed by Dr. Roxi Middleton, neurology, and has had an EMG in the past and states that he thinks she may have a pinched nerve in her elbow or neck. She has an MRI scheduled with him in 3 days, but notes the pain is \"very bad\" and is causing her to have migraines. Her last appointment with him was on 1/27/18. Pt is requesting her MRI be moved up to today. She has not taken any pain medication and denies recent steroid or abx use. She denies hx of DVT, PE, or carpal tunnel. She denies any prior injuries to her back or neck. She denies any fevers, chills, n/v/d, CP, weakness, or  SOB. PCP: Kip Santos MD    There are no other complaints, changes, or physical findings at this time. Current Outpatient Prescriptions   Medication Sig Dispense Refill    traZODone (DESYREL) 50 mg tablet Take 0.5 Tabs by mouth nightly. 30 Tab 1    doxycycline (MONODOX) 100 mg capsule Take 1 Cap by mouth two (2) times a day. 20 Cap 0    zolpidem (AMBIEN) 5 mg tablet Take 1 Tab by mouth nightly as needed for Sleep. Max Daily Amount: 5 mg. 10 Tab 0    busPIRone (BUSPAR) 7.5 mg tablet TAKE 1 TABLET BY MOUTH THREE TIMES DAILY 90 Tab 0    benzonatate (TESSALON) 200 mg capsule Take 1 Cap by mouth three (3) times daily as needed for Cough.  30 Cap 0  phentermine 37.5 mg capsule Take 37.5 mg by mouth every morning. Past History     Past Medical History:  Past Medical History:   Diagnosis Date    Chronic pain     low back    Uterine fibroid        Past Surgical History:  Past Surgical History:   Procedure Laterality Date    HX  SECTION      HX  SECTION      HX OTHER SURGICAL      WISDOM TEETH EXTRACTION    HX TUBAL LIGATION      MYOMECTOMY 1-4,W/TOT 250GMS/<, [address]      fibroid removal by Dr. Rain Rodrigez       Family History:  Family History   Problem Relation Age of Onset    Diabetes Mother     Asthma Mother     Elevated Lipids Mother     Liver Disease Father      CHIRROSIS    Alcohol abuse Father     No Known Problems Son     No Known Problems Son     Other Sister      PEPTIC ULCER    Diabetes Maternal Aunt     Diabetes Maternal Grandmother     Other Other      NO ANESTHESIA PROBLEMS IN FAMILY    Anesth Problems Neg Hx        Social History:  Social History   Substance Use Topics    Smoking status: Former Smoker     Packs/day: 0.25     Years: 15.00     Types: Cigarettes     Quit date: 1/3/2017    Smokeless tobacco: Current User      Comment: GIVEN \"STOP SMOKING\" HANDOUT.    Alcohol use No       Allergies: Allergies   Allergen Reactions    Pcn [Penicillins] Rash    Pork/Porcine Containing Products Rash    Shellfish Containing Products Rash    Zoloft [Sertraline] Other (comments)     Dysphoria, insomnia         Review of Systems   Review of Systems   Constitutional: Negative. Negative for chills and fever. HENT: Negative. Negative for congestion and rhinorrhea. Respiratory: Negative. Negative for cough, chest tightness and wheezing. Cardiovascular: Negative. Negative for chest pain and palpitations. Gastrointestinal: Negative. Negative for abdominal pain, constipation, nausea and vomiting. Endocrine: Negative. Genitourinary: Negative.   Negative for decreased urine volume, flank pain, genital sores, hematuria and pelvic pain. Musculoskeletal: Positive for myalgias (LUE). Negative for back pain and neck pain. Skin: Negative. Negative for color change, pallor and rash. Neurological: Negative. Negative for dizziness, seizures, weakness, numbness and headaches. Hematological: Negative. Negative for adenopathy. Psychiatric/Behavioral: Negative. All other systems reviewed and are negative. Physical Exam   Physical Exam   Constitutional: She is oriented to person, place, and time. She appears well-developed and well-nourished. No distress. HENT:   Head: Normocephalic and atraumatic. Mouth/Throat: No oropharyngeal exudate. Eyes: Conjunctivae are normal. Pupils are equal, round, and reactive to light. Right eye exhibits no discharge. Left eye exhibits no discharge. No scleral icterus. Neck: Neck supple. No JVD present. Muscular tenderness present. No spinous process tenderness present. Carotid bruit is not present. Decreased range of motion present. No Brudzinski's sign and no Kernig's sign noted. Tender left paraspinal and supraspinatus area, no warmth or crepitus   Cardiovascular: Normal rate, regular rhythm, normal heart sounds and intact distal pulses. Exam reveals no gallop and no friction rub. No murmur heard. Pulmonary/Chest: Effort normal and breath sounds normal. No stridor. No respiratory distress. She has no wheezes. She has no rales. She exhibits no tenderness. Abdominal: Soft. Bowel sounds are normal. She exhibits no distension and no mass. There is no tenderness. There is no rebound and no guarding. Musculoskeletal:        Left shoulder: She exhibits tenderness and pain. She exhibits normal range of motion, no swelling, no effusion, no spasm, normal pulse and normal strength. No crepitus to shoulder joint, no cords or swelling in upper arm   Neurological: She is alert and oriented to person, place, and time. She has normal strength. She displays normal reflexes. No cranial nerve deficit or sensory deficit. She exhibits normal muscle tone. Coordination normal. GCS eye subscore is 4. GCS verbal subscore is 5. GCS motor subscore is 6. Reflex Scores:       Bicep reflexes are 2+ on the right side and 2+ on the left side. Patellar reflexes are 2+ on the right side and 2+ on the left side. Patient has had intermittent numbness to left face none at this time   Skin: Skin is warm. No rash noted. She is not diaphoretic. No pallor. Vitals reviewed. Diagnostic Study Results     Labs -     Recent Results (from the past 12 hour(s))   CBC WITH AUTOMATED DIFF    Collection Time: 02/02/18  4:44 PM   Result Value Ref Range    WBC 10.2 3.6 - 11.0 K/uL    RBC 3.87 3.80 - 5.20 M/uL    HGB 12.0 11.5 - 16.0 g/dL    HCT 36.7 35.0 - 47.0 %    MCV 94.8 80.0 - 99.0 FL    MCH 31.0 26.0 - 34.0 PG    MCHC 32.7 30.0 - 36.5 g/dL    RDW 12.3 11.5 - 14.5 %    PLATELET 423 529 - 594 K/uL    MPV 10.7 8.9 - 12.9 FL    NRBC 0.0 0  WBC    ABSOLUTE NRBC 0.00 0.00 - 0.01 K/uL    NEUTROPHILS 69 32 - 75 %    LYMPHOCYTES 24 12 - 49 %    MONOCYTES 5 5 - 13 %    EOSINOPHILS 1 0 - 7 %    BASOPHILS 0 0 - 1 %    IMMATURE GRANULOCYTES 0 0.0 - 0.5 %    ABS. NEUTROPHILS 7.0 1.8 - 8.0 K/UL    ABS. LYMPHOCYTES 2.5 0.8 - 3.5 K/UL    ABS. MONOCYTES 0.5 0.0 - 1.0 K/UL    ABS. EOSINOPHILS 0.1 0.0 - 0.4 K/UL    ABS. BASOPHILS 0.0 0.0 - 0.1 K/UL    ABS. IMM.  GRANS. 0.0 0.00 - 0.04 K/UL    DF AUTOMATED     METABOLIC PANEL, COMPREHENSIVE    Collection Time: 02/02/18  4:44 PM   Result Value Ref Range    Sodium 138 136 - 145 mmol/L    Potassium 4.1 3.5 - 5.1 mmol/L    Chloride 106 97 - 108 mmol/L    CO2 25 21 - 32 mmol/L    Anion gap 7 5 - 15 mmol/L    Glucose 101 (H) 65 - 100 mg/dL    BUN 5 (L) 6 - 20 MG/DL    Creatinine 0.68 0.55 - 1.02 MG/DL    BUN/Creatinine ratio 7 (L) 12 - 20      GFR est AA >60 >60 ml/min/1.73m2    GFR est non-AA >60 >60 ml/min/1.73m2    Calcium 8.8 8.5 - 10.1 MG/DL    Bilirubin, total 0.5 0.2 - 1.0 MG/DL    ALT (SGPT) 28 12 - 78 U/L    AST (SGOT) 15 15 - 37 U/L    Alk. phosphatase 80 45 - 117 U/L    Protein, total 7.4 6.4 - 8.2 g/dL    Albumin 3.5 3.5 - 5.0 g/dL    Globulin 3.9 2.0 - 4.0 g/dL    A-G Ratio 0.9 (L) 1.1 - 2.2     TROPONIN I    Collection Time: 02/02/18  4:44 PM   Result Value Ref Range    Troponin-I, Qt. <0.04 <0.05 ng/mL   D DIMER    Collection Time: 02/02/18  4:44 PM   Result Value Ref Range    D-dimer 0.32 0.00 - 0.65 mg/L FEU   EKG, 12 LEAD, INITIAL    Collection Time: 02/02/18  4:49 PM   Result Value Ref Range    Ventricular Rate 85 BPM    Atrial Rate 85 BPM    P-R Interval 96 ms    QRS Duration 92 ms    Q-T Interval 346 ms    QTC Calculation (Bezet) 411 ms    Calculated R Axis 46 degrees    Calculated T Axis 44 degrees    Diagnosis       Sinus rhythm with short HI  When compared with ECG of 30-AUG-2016 15:40,  ST no longer depressed in Inferior leads         Radiologic Studies -   MRI CERV SPINE W WO CONT   Degenerative disc disease at C5-C6.     Preliminary report was provided by Dr. Verito Mcmahon, the on-call radiologist, at [address]  hours.     Final report to follow. MRI BRAIN W WO CONT   Final Result   EXAM:  MRI BRAIN W WO CONT  INDICATION:  facial numbness, and left neck shoulder and arm pain. TECHNIQUE: Sagittal T1, axial FLAIR, T2, T1 and gradient echo T2-weighted images  of the head were obtained followed by intravenous infusion 18 mL ProHance repeat  axial and coronal T1-weighted images and axial diffusion weighted images. Coronal T2.  COMPARISON: None available. FINDINGS:  The ventricular size and configuration are normal.   Normal signal demonstrated in the cerebral hemispheres, brain stem and  cerebellum. No abnormal areas of intracranial enhancement. No abnormal diffusion. No evidence of intracranial hemorrhage, infarct, mass or abnormal extra-axial  fluid collections.    Flow voids are present in the vertebral, basilar and carotid artery systems. The craniocervical junction is normal.   The structures of the cranial base including paranasal sinuses are   unremarkable.     IMPRESSION  IMPRESSION: Normal MRI of the head. .      XR CHEST PA LAT   Final Result        CT Results  (Last 48 hours)    None        CXR Results  (Last 48 hours)               02/02/18 1714  XR CHEST PA LAT Final result    Impression:  IMPRESSION: Normal chest.           Narrative:  EXAM:  XR CHEST PA LAT. INDICATION: Chest pain. COMPARISON: 2/1/2017. FINDINGS:    PA and lateral radiographs of the chest were obtained. Lungs: The lungs are clear of mass, nodule, airspace disease or edema. Pleura: There is no pleural effusion or pneumothorax. Mediastinum: The cardiac and mediastinal contours and pulmonary vascularity are   normal.   Bones and soft tissues: The bones and soft tissues are within normal limits. Medical Decision Making   I am the first provider for this patient. I reviewed the vital signs, available nursing notes, past medical history, past surgical history, family history and social history. Vital Signs-Reviewed the patient's vital signs. Patient Vitals for the past 12 hrs:   Temp Pulse Resp BP SpO2   02/02/18 1736 - - - 110/69 -   02/02/18 1450 97.7 °F (36.5 °C) (!) 107 12 134/78 99 %       Pulse Oximetry Analysis - 99% on RA      EKG interpretation: (Preliminary)  Rhythm: normal sinus rhythm; and regular . Rate (approx.): 85; Axis: normal; CA interval: short; QRS interval: normal ; ST/T wave: normal; Other findings: normal.  Written by SILVERIO Mar, as dictated by Reginaldo Steele MD.    Records Reviewed: Old Medical Records    Provider Notes (Medical Decision Making):   DDx: vertebral artery dissection, ICH, CVA, cervical radiculopathy, peripheral neuropathy, c arpal tunnel syndrome, DVT, arthritis. HX of chronic LUE discomfort below her elbow.  Evaluated by neuro with recent EMG Has scheduled MRI head and neck in the future. Here with pain in shoulder and vague facial numbness. Discussed with neuro who recommended MRI brain and neck. Those results were normal except mild DDD, will refer back to neuro. ED Course:   Initial assessment performed. The patients presenting problems have been discussed, and they are in agreement with the care plan formulated and outlined with them. I have encouraged them to ask questions as they arise throughout their visit. CONSULT NOTE:   4:21 PM  Marky Paulson MD spoke with Dr. Luis Escobedo,   Specialty: neurology  Discussed pt's hx, disposition, and available diagnostic and imaging results. Reviewed care plans. Consultant agrees with plans as outlined. Dr Luis Escobedo recommends MRI brain with and without and MRI C-spine if able to get MRI today. Written by Graciela Worley, ED Scribe, as dictated by Marky Paulson MD.      Critical Care Time:   None    Disposition:  Discharge Note:  8:39 PM  The pt is ready for discharge. The pt's signs, symptoms, diagnosis, and discharge instructions have been discussed and pt has conveyed their understanding. The pt is to follow up as recommended or return to ER should their symptoms worsen. Plan has been discussed and pt is in agreement. PLAN:  1. Discharge Medication List as of 2/2/2018  8:36 PM      START taking these medications    Details   methylPREDNISolone (MEDROL, FAITH,) 4 mg tablet UAD, Print, Disp-1 Dose Pack, R-0      HYDROcodone-acetaminophen (NORCO) 5-325 mg per tablet Take 1 Tab by mouth every four (4) hours as needed for Pain. Max Daily Amount: 6 Tabs., Print, Disp-15 Tab, R-0      cyclobenzaprine (FLEXERIL) 10 mg tablet Take 1 Tab by mouth three (3) times daily as needed for Muscle Spasm(s). , Print, Disp-15 Tab, R-0         CONTINUE these medications which have NOT CHANGED    Details   traZODone (DESYREL) 50 mg tablet Take 0.5 Tabs by mouth nightly., Normal, Disp-30 Tab, R-1      doxycycline (MONODOX) 100 mg capsule Take 1 Cap by mouth two (2) times a day., Normal, Disp-20 Cap, R-0      zolpidem (AMBIEN) 5 mg tablet Take 1 Tab by mouth nightly as needed for Sleep. Max Daily Amount: 5 mg., Print, Disp-10 Tab, R-0      busPIRone (BUSPAR) 7.5 mg tablet TAKE 1 TABLET BY MOUTH THREE TIMES DAILY, Normal, Disp-90 Tab, R-0      benzonatate (TESSALON) 200 mg capsule Take 1 Cap by mouth three (3) times daily as needed for Cough., Normal, Disp-30 Cap, R-0      phentermine 37.5 mg capsule Take 37.5 mg by mouth every morning., Historical Med           2. Follow-up Information     Follow up With Details Comments [address], [place] Schedule an appointment as soon as possible for a visit in 2 days  [address] [place] [address]  [phone]      [facility] EMERGENCY DEPT  If symptoms worsen [address] [place] [address]  [phone]        Return to ED if worse     Diagnosis     Clinical Impression:   1. DDD (degenerative disc disease), cervical    2. Radicular pain        Attestations:    Attestations: This note is prepared by Baltazar Baum, acting as Scribe for MD Parth Jarrett MD: The scribe's documentation has been prepared under my direction and personally reviewed by me in its entirety. I confirm that the note above accurately reflects all work, treatment, procedures, and medical decision making performed by me.

## 2018-02-02 NOTE — ED TRIAGE NOTES
C/o pain to neck/shoulder/chest arm on left. States seeing a neurologist for nerve pain to same and due to have an MRI on Monday. States fearful that she might have a brain tumor or something and does not understand why hurting. Tearful.

## 2018-02-02 NOTE — ROUTINE PROCESS

## 2018-02-02 NOTE — LETTER
[address] 
[facility] EMERGENCY DEPT 
[address] [place] [address] [address] [id number] 
[phone] Work/School Note Date: 2/2/2018 To Whom It May concern: 
 
Tana Pascal was seen and treated today in the emergency room by the following provider(s): 
Attending Provider: Beto Talley MD. Tana Pascal No work till 2/4/18 Sincerely, Beto Talley MD

## 2018-02-03 LAB
ATRIAL RATE: 85 BPM
CALCULATED R AXIS, ECG10: 46 DEGREES
CALCULATED T AXIS, ECG11: 44 DEGREES
DIAGNOSIS, 93000: NORMAL
P-R INTERVAL, ECG05: 96 MS
Q-T INTERVAL, ECG07: 346 MS
QRS DURATION, ECG06: 92 MS
QTC CALCULATION (BEZET), ECG08: 411 MS
VENTRICULAR RATE, ECG03: 85 BPM

## 2018-02-03 NOTE — DISCHARGE INSTRUCTIONS
Back Pain: Care Instructions  Your Care Instructions    Back pain has many possible causes. It is often related to problems with muscles and ligaments of the back. It may also be related to problems with the nerves, discs, or bones of the back. Moving, lifting, standing, sitting, or sleeping in an awkward way can strain the back. Sometimes you don't notice the injury until later. Arthritis is another common cause of back pain. Although it may hurt a lot, back pain usually improves on its own within several weeks. Most people recover in 12 weeks or less. Using good home treatment and being careful not to stress your back can help you feel better sooner. Follow-up care is a key part of your treatment and safety. Be sure to make and go to all appointments, and call your doctor if you are having problems. It's also a good idea to know your test results and keep a list of the medicines you take. How can you care for yourself at home? · Sit or lie in positions that are most comfortable and reduce your pain. Try one of these positions when you lie down:  ¨ Lie on your back with your knees bent and supported by large pillows. ¨ Lie on the floor with your legs on the seat of a sofa or chair. [place] on your side with your knees and hips bent and a pillow between your legs. ¨ Lie on your stomach if it does not make pain worse. · Do not sit up in bed, and avoid soft couches and twisted positions. Bed rest can help relieve pain at first, but it delays healing. Avoid bed rest after the first day of back pain. · Change positions every 30 minutes. If you must sit for long periods of time, take breaks from sitting. Get up and walk around, or lie in a comfortable position. · Try using a heating pad on a low or medium setting for 15 to 20 minutes every 2 or 3 hours. Try a warm shower in place of one session with the heating pad. · You can also try an ice pack for 10 to 15 minutes every 2 to 3 hours.  Put a thin cloth between the ice pack and your skin. · Take pain medicines exactly as directed. ¨ If the doctor gave you a prescription medicine for pain, take it as prescribed. ¨ If you are not taking a prescription pain medicine, ask your doctor if you can take an over-the-counter medicine. · Take short walks several times a day. You can start with 5 to 10 minutes, 3 or 4 times a day, and work up to longer walks. Walk on level surfaces and avoid hills and stairs until your back is better. · Return to work and other activities as soon as you can. Continued rest without activity is usually not good for your back. · To prevent future back pain, do exercises to stretch and strengthen your back and stomach. Learn how to use good posture, safe lifting techniques, and proper body mechanics. When should you call for help? Call your doctor now or seek immediate medical care if:  ? · You have new or worsening numbness in your legs. ? · You have new or worsening weakness in your legs. (This could make it hard to stand up.)   ? · You lose control of your bladder or bowels. ? Watch closely for changes in your health, and be sure to contact your doctor if:  ? · Your pain gets worse. ? · You are not getting better after 2 weeks. Where can you learn more? Go to http://mansoor-eugene.info/. Enter P641 in the search box to learn more about \"Back Pain: Care Instructions. \"  Current as of: March 21, 2017  Content Version: 11.4  © 6532-7314 [employer]. Care instructions adapted under license by [employer] (which disclaims liability or warranty for this information). If you have questions about a medical condition or this instruction, always ask your healthcare professional. [address] any warranty or liability for your use of this information. [employer] Activation    Thank you for requesting access to [employer].  Please follow the instructions below to securely access and download your online medical record. Kudo allows you to send messages to your doctor, view your test results, renew your prescriptions, schedule appointments, and more. How Do I Sign Up? 1. In your internet browser, go to [email or web address]  2. Click on the First Time User? Click Here link in the Sign In box. You will be redirect to the New Member Sign Up page. 3. Enter your Kudo Access Code exactly as it appears below. You will not need to use this code after youve completed the sign-up process. If you do not sign up before the expiration date, you must request a new code. Kudo Access Code: Activation code not generated  Current Kudo Status: Active (This is the date your Kudo access code will )    4. Enter the last four digits of your Social Security Number (xxxx) and Date of Birth (mm/dd/yyyy) as indicated and click Submit. You will be taken to the next sign-up page. 5. Create a Kudo ID. This will be your Kudo login ID and cannot be changed, so think of one that is secure and easy to remember. 6. Create a Kudo password. You can change your password at any time. 7. Enter your Password Reset Question and Answer. This can be used at a later time if you forget your password. 8. Enter your e-mail address. You will receive e-mail notification when new information is available in [address] 9. Click Sign Up. You can now view and download portions of your medical record. 10. Click the Download Summary menu link to download a portable copy of your medical information. Additional Information    If you have questions, please visit the Frequently Asked Questions section of the Kudo website at [email or web address] Remember, Kudo is NOT to be used for urgent needs. For medical emergencies, dial 911.

## 2018-02-03 NOTE — ED NOTES
Bedside shift change report given to Marie Torres (oncoming nurse) by Travis Gavin (offgoing nurse). Report included the following information SBAR, ED Summary, MAR and Recent Results.

## 2018-02-03 NOTE — ED NOTES
Dr. Kaveh Casey reviewed discharge instructions with the patient. The patient verbalized understanding. Patient ambulatory out of ED with discharge paperwork in hand.

## 2018-02-05 ENCOUNTER — TELEPHONE (OUTPATIENT)
Dept: NEUROLOGY | Age: 38
End: 2018-02-05

## 2018-02-05 ENCOUNTER — TELEPHONE (OUTPATIENT)
Dept: INTERNAL MEDICINE CLINIC | Age: 38
End: 2018-02-05

## 2018-02-05 NOTE — TELEPHONE ENCOUNTER
Patient was seen in the [facility] on last Friday and was told to f/u with pcp right away. I told her I am going to get the nurse to call her back to assist with that. Dr Gloria Davalos schedule is pretty booked.  Please advise

## 2018-02-05 NOTE — TELEPHONE ENCOUNTER
Dr. Dani Mobley patient states she is in a lot of pain from the left side of her neck down arms. Patient was in the ED over the weekend. Have schedule her an appointment for 2/13/2018. Patient would like something for pain. Please advise.

## 2018-02-05 NOTE — TELEPHONE ENCOUNTER
----- Message from Wallace Mirza sent at 2/5/2018  1:11 PM EST -----  Regarding: Dr. Snow Smith  Pt would like a call back from the nurse regarding what she should do. Pt was instructed to see Dr. Annabelle Arias to follow up on the MRI that was done on Friday, 2/2/18. Pt has a disc disorder and is in a lot of pain. Pt can be reached at [phone].

## 2018-02-05 NOTE — TELEPHONE ENCOUNTER
Spoke with patient. Two pt identifiers confirmed. Patient states that she was recently seen in the ER for back pain and was diagnosed with DJD. Patient states that she was told to follow up with her PCP and is very upset because she was told that she could not see Dr. Janeth Power until March. Advised patient that Dr. Janeth Power has some openings at the end of February, but patient is still upset about having to wait that long. Advised patient that I would have to check with Dr. Janeth Power to see if she would be ok with us working patient in one day this week. Pt verbalized understanding of information discussed w/ no further questions at this time.

## 2018-02-06 NOTE — TELEPHONE ENCOUNTER
I don't have any openings this week. She needs to see Dr. Radha Sinclair for further evaluation of the mild disc bulging in her cervical spine. If someone else in this office has an opening she may be able to be seen this week. Most people have some DJD. Hers is described as mild. She has a history of chronic back pain. She sees Dr. Dani Mobley on 2/14/18. He initially ordered the MRI of her cervical spine which she requested early in the ER.

## 2018-02-07 ENCOUNTER — TELEPHONE (OUTPATIENT)
Dept: NEUROLOGY | Age: 38
End: 2018-02-07

## 2018-02-07 DIAGNOSIS — R52 PAIN: Primary | ICD-10-CM

## 2018-02-07 DIAGNOSIS — M54.10 RADICULAR PAIN: ICD-10-CM

## 2018-02-07 RX ORDER — TRAMADOL HYDROCHLORIDE 50 MG/1
50 TABLET ORAL
Qty: 21 TAB | Refills: 0 | OUTPATIENT
Start: 2018-02-07 | End: 2018-02-13

## 2018-02-07 RX ORDER — CYCLOBENZAPRINE HCL 10 MG
10 TABLET ORAL
Qty: 45 TAB | Refills: 0 | Status: SHIPPED | OUTPATIENT
Start: 2018-02-07 | End: 2018-04-11 | Stop reason: SDUPTHER

## 2018-02-07 NOTE — TELEPHONE ENCOUNTER
----- Message from Vinod Garcia sent at 2/7/2018  1:18 PM EST -----  Regarding: Dr. Yvette Rogel  Pt stated she left a message on Monday for the nurse regarding pain medication for degenerative disc disease and went to the ER on Friday, and have not received a call back. Pt stated she would like a call back today. Pt stated she was advised that the doctor was out, but she need something for the pain. Best contact number [phone]. Jameel العلي

## 2018-02-07 NOTE — TELEPHONE ENCOUNTER
I spoke with Dr Larisa Coon by phone and he will be sending in tramadol for the patient to last until her appointment next week. Called in per his instructions.     Left detailed message of this and asked for call back if she has further questions before her appointment

## 2018-02-07 NOTE — TELEPHONE ENCOUNTER
The following appointments have been successfully scheduled:    Date/time Tuesday, February 13, 2018 08:15 AM  Patient Neyda Hammond 1980 (77ZS F) #[id number]  Department [facility]-MAIN OFFICE-[place] [address]  Appointment type Routine Care  Provider Tata William  Please remind the patient that the office visit copay is $25.00. Payment is expected at the time of service. Appointment has been scheduled with Dr. Kirby Valencia on 2/22/18  8:20 AM arrival time at [facility] [address]. Phone [phone].       Patient was left am voice message and web message regarding  The above appointments

## 2018-02-07 NOTE — TELEPHONE ENCOUNTER
Patient states that the ER gave her flexeril and hydrocodone and its working for her. Patient would like to know if Ezequiel Soliman would right her a enough for 4 days until her apptmt on 2/13.  She states that tramadol is not working for her

## 2018-02-08 ENCOUNTER — TELEPHONE (OUTPATIENT)
Dept: NEUROLOGY | Age: 38
End: 2018-02-08

## 2018-02-08 ENCOUNTER — TELEPHONE (OUTPATIENT)
Dept: INTERNAL MEDICINE CLINIC | Age: 38
End: 2018-02-08

## 2018-02-08 NOTE — TELEPHONE ENCOUNTER
Left message that for patient that Dr. Odilia Colon has sent tramdol into the the pharmacy and we will see your on 2/13/2018 for your appointment.

## 2018-02-08 NOTE — TELEPHONE ENCOUNTER
Pt is complaining of pain. I had ordered tramadol earlier for 1 wk but she is requesting hydrocodone and flexeril. I did tell her that her mri of the brain and c spine and the emg do not correlate with the pain and her pain is not neurologic in etiology. I will not be prescribing hydrocodone. I will give her flexeril for 2 wks and then needs to get back to pcp for pain management as it is not neurologic.

## 2018-02-08 NOTE — TELEPHONE ENCOUNTER
Left another message on voice mail that medication tramdol has been sent into the pharmacy and we will see her on 2/13/2018.

## 2018-02-08 NOTE — TELEPHONE ENCOUNTER
----- Message from Floyd Dai sent at 2/7/2018  6:57 PM EST -----  Regarding: Dr. Yoel Qureshi,    Pt states she went to the ER last weekend, and have been trying to get an appointment due to she is still in a lot of pain. Pt is requesting for a pain medication to be sent to [facility], [facility]. [phone]. Best contact number is [phone].         Message copied/pasted from [facility]

## 2018-02-09 ENCOUNTER — OFFICE VISIT (OUTPATIENT)
Dept: INTERNAL MEDICINE CLINIC | Age: 38
End: 2018-02-09

## 2018-02-09 VITALS
SYSTOLIC BLOOD PRESSURE: 118 MMHG | OXYGEN SATURATION: 100 % | TEMPERATURE: 98.3 F | HEIGHT: 60 IN | HEART RATE: 149 BPM | BODY MASS INDEX: 37.3 KG/M2 | DIASTOLIC BLOOD PRESSURE: 80 MMHG | RESPIRATION RATE: 18 BRPM | WEIGHT: 190 LBS

## 2018-02-13 ENCOUNTER — OFFICE VISIT (OUTPATIENT)
Dept: NEUROLOGY | Age: 38
End: 2018-02-13

## 2018-02-13 VITALS
OXYGEN SATURATION: 99 % | HEIGHT: 60 IN | HEART RATE: 99 BPM | WEIGHT: 189.8 LBS | BODY MASS INDEX: 37.26 KG/M2 | SYSTOLIC BLOOD PRESSURE: 118 MMHG | DIASTOLIC BLOOD PRESSURE: 78 MMHG

## 2018-02-13 DIAGNOSIS — G56.22 ULNAR NEUROPATHY AT ELBOW OF LEFT UPPER EXTREMITY: Primary | ICD-10-CM

## 2018-02-13 DIAGNOSIS — M54.2 NECK PAIN: ICD-10-CM

## 2018-02-13 RX ORDER — BUSPIRONE HYDROCHLORIDE 7.5 MG/1
TABLET ORAL
Qty: 90 TAB | Refills: 0 | Status: SHIPPED | OUTPATIENT
Start: 2018-02-13 | End: 2019-10-22

## 2018-02-13 NOTE — LETTER
2/13/2018 4:01 PM 
 
Patient:    Alex Smiley YOB: 1980 Date of Visit:    2/13/2018 Dear Perry Rangel MD 
 
Thank you for referring Ms. Alex Smiley to me for evaluation/treatment. Below are the relevant portions of my assessment and plan of care. Neurology Note REFERRED BY: 
Perry Rangel MD 
 
02/13/18 Chief complaint: Left arm numbness SUBJECTIVE: 
 
Alex Smiley is a 45 y.o. female who presented to the neurology office for management of left arm numbness and neck pain. The numbness has been going on since the end of December 2017 and it is off and on. It does involve the medial 4 fingers of the left arm. She does also complain of pain around her left elbow. She has been using an elbow splint and that is helping her some. EMG/nerve conduction study did show a mild left ulnar neuropathy. Since the last visit patient has come to the office with request to be seen the same day but I was not able to see her because of no available appointments. Tramadol was called and for the patient but she never filled it. MRI of the brain and cervical spine has been performed since the last visit and they have been essentially normal.  She states that the pain is better and it is 6-7 out of 10 in severity. Current Outpatient Prescriptions Medication Sig  cyclobenzaprine (FLEXERIL) 10 mg tablet Take 1 Tab by mouth three (3) times daily as needed for Muscle Spasm(s) for up to 14 days.  zolpidem (AMBIEN) 5 mg tablet Take 1 Tab by mouth nightly as needed for Sleep. Max Daily Amount: 5 mg.  busPIRone (BUSPAR) 7.5 mg tablet TAKE 1 TABLET BY MOUTH THREE TIMES DAILY No current facility-administered medications for this visit. Allergies Allergen Reactions  Pcn [Penicillins] Rash  Pork/Porcine Containing Products Rash  Shellfish Containing Products Rash  Zoloft [Sertraline] Other (comments) Dysphoria, insomnia Past Medical History: Diagnosis Date  Chronic pain   
 low back  Uterine fibroid Past Surgical History:  
Procedure Laterality Date  HX  SECTION    HX  SECTION    HX OTHER SURGICAL    
 WISDOM TEETH EXTRACTION  
 HX TUBAL LIGATION    
 MYOMECTOMY 1-4,W/TOT 250GMS/<, [facility]  
 fibroid removal by Dr. Levi Gonzalez Family History Problem Relation Age of Onset  Diabetes Mother  Asthma Mother  Elevated Lipids Mother  Liver Disease Father Juanjo Boys  Alcohol abuse Father  No Known Problems Son  No Known Problems Son   
[facility] Other Sister PEPTIC ULCER  
 Diabetes Maternal Aunt  Diabetes Maternal Grandmother  Other Other NO ANESTHESIA PROBLEMS IN FAMILY  Anesth Problems Neg Hx Social History Substance Use Topics  Smoking status: Former Smoker Packs/day: 0.25 Years: 15.00 Types: Cigarettes Quit date: 1/3/2017  Smokeless tobacco: Current User Comment: GIVEN \"STOP SMOKING\" HANDOUT.  Alcohol use No  
 
 
REVIEW OF SYSTEMS:  
A ten system review of constitutional, cardiovascular, respiratory, musculoskeletal, endocrine, skin, SHEENT, genitourinary, psychiatric and neurologic systems was obtained and is unremarkable with the exception of anxiety, constipation, cough, depression, muscle pain, muscle weakness, poor appetite and snoring EXAMINATION:  
Visit Vitals  /78  Pulse 99  
 Ht 5' (1.524 m)  Wt 189 lb 12.8 oz (86.1 kg)  LMP 2016  SpO2 99%  BMI 37.07 kg/m2 General:  
General appearance: Pt is in no acute distress Distal pulses are preserved Neurological Examination:  
Mental Status: AAO x3. Speech is fluent. Follows commands, has normal fund of knowledge, attention, short term recall, comprehension and insight. Cranial Nerves: Visual fields are full. PERRL, Extraocular movements are full. Facial sensation intact V1- V3. Facial movement intact, symmetric. Hearing intact to conversation. Palate elevates symmetrically. Shoulder shrug symmetric. Tongue midline. Motor: Strength is 5/5 in all 4 ext. No atrophy. Tone: Normal 
 
Sensation: Normal to light touch Coordination/Cerebellar: Intact to finger-nose-finger Gait: casual gait is normal.  
 
Skin: No significant bruising or lacerations. Laboratory review:  
Results for orders placed or performed during the hospital encounter of 02/02/18 CBC WITH AUTOMATED DIFF Result Value Ref Range WBC 10.2 3.6 - 11.0 K/uL  
 RBC 3.87 3.80 - 5.20 M/uL  
 HGB 12.0 11.5 - 16.0 g/dL HCT 36.7 35.0 - 47.0 % MCV 94.8 80.0 - 99.0 FL  
 MCH 31.0 26.0 - 34.0 PG  
 MCHC 32.7 30.0 - 36.5 g/dL  
 RDW 12.3 11.5 - 14.5 % PLATELET 112 981 - 615 K/uL MPV 10.7 8.9 - 12.9 FL  
 NRBC 0.0 0  WBC ABSOLUTE NRBC 0.00 0.00 - 0.01 K/uL NEUTROPHILS 69 32 - 75 % LYMPHOCYTES 24 12 - 49 % MONOCYTES 5 5 - 13 % EOSINOPHILS 1 0 - 7 % BASOPHILS 0 0 - 1 % IMMATURE GRANULOCYTES 0 0.0 - 0.5 % ABS. NEUTROPHILS 7.0 1.8 - 8.0 K/UL  
 ABS. LYMPHOCYTES 2.5 0.8 - 3.5 K/UL  
 ABS. MONOCYTES 0.5 0.0 - 1.0 K/UL  
 ABS. EOSINOPHILS 0.1 0.0 - 0.4 K/UL  
 ABS. BASOPHILS 0.0 0.0 - 0.1 K/UL  
 ABS. IMM. GRANS. 0.0 0.00 - 0.04 K/UL  
 DF AUTOMATED METABOLIC PANEL, COMPREHENSIVE Result Value Ref Range Sodium 138 136 - 145 mmol/L Potassium 4.1 3.5 - 5.1 mmol/L Chloride 106 97 - 108 mmol/L  
 CO2 25 21 - 32 mmol/L Anion gap 7 5 - 15 mmol/L Glucose 101 (H) 65 - 100 mg/dL BUN 5 (L) 6 - 20 MG/DL Creatinine 0.68 0.55 - 1.02 MG/DL  
 BUN/Creatinine ratio 7 (L) 12 - 20 GFR est AA >60 >60 ml/min/1.73m2 GFR est non-AA >60 >60 ml/min/1.73m2 Calcium 8.8 8.5 - 10.1 MG/DL Bilirubin, total 0.5 0.2 - 1.0 MG/DL  
 ALT (SGPT) 28 12 - 78 U/L  
 AST (SGOT) 15 15 - 37 U/L Alk. phosphatase 80 45 - 117 U/L Protein, total 7.4 6.4 - 8.2 g/dL Albumin 3.5 3.5 - 5.0 g/dL Globulin 3.9 2.0 - 4.0 g/dL A-G Ratio 0.9 (L) 1.1 - 2.2    
TROPONIN I Result Value Ref Range Troponin-I, Qt. <0.04 <0.05 ng/mL D DIMER Result Value Ref Range D-dimer 0.32 0.00 - 0.65 mg/L FEU  
EKG, 12 LEAD, INITIAL Result Value Ref Range Ventricular Rate 85 BPM  
 Atrial Rate 85 BPM  
 P-R Interval 96 ms QRS Duration 92 ms Q-T Interval 346 ms  
 QTC Calculation (Bezet) 411 ms Calculated R Axis 46 degrees Calculated T Axis 44 degrees Diagnosis Sinus rhythm with short [place] When compared with ECG of 30-AUG-2016 15:40, No significant change Confirmed by Enma Kovacs ([id number]) on 2/3/2018 7:25:56 AM 
  
 
 
Imaging review: MRI brain Normal MRI of the head. Cesar Bob MRI cervical spine 1. C5-6 mild disc bulge. Minimal stenosis. 2. C6-7 minimal disc bulge without stenosis. Documentation review: 
None Assessment/Plan:  
Molly Maier is a 45 y.o. female who presented to the neurology office for management of left arm numbness that has been going on intermittently since the end of December. 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 The patient is complaining of significant neck pain on the left side. EMG/nerve conduction study did not show cervical radiculopathy and MRI of the brain and cervical spine as been normal.  It seems to be musculoskeletal in etiology. We will send the patient for physical therapy. Since no clear neurological etiology has been found out, will send the patient back to the primary care physician. ICD-10-CM ICD-9-CM 1. Ulnar neuropathy at elbow of left upper extremity G56.22 354.2 2. Neck pain M54.2 723.1 REFERRAL TO PHYSICAL THERAPY Thank you for allowing me to participate in the care of Ms. Amy Cummings. Please feel free to contact me if you have any questions. Renetta Espana MD 
Neurologist 
 
CC: Brendon Olivares MD 
Fax: [phone] This note was created using voice recognition software. Despite editing, there may be syntax errors. This note will not be viewable in [address] If you have questions, please do not hesitate to call me. I look forward to following Ms. Dereck Calderón along with you. Sincerely, Renetta Espana MD

## 2018-02-13 NOTE — PATIENT INSTRUCTIONS
A Healthy Lifestyle: Care Instructions  Your Care Instructions    A healthy lifestyle can help you feel good, stay at a healthy weight, and have plenty of energy for both work and play. A healthy lifestyle is something you can share with your whole family. A healthy lifestyle also can lower your risk for serious health problems, such as high blood pressure, heart disease, and diabetes. You can follow a few steps listed below to improve your health and the health of your family. Follow-up care is a key part of your treatment and safety. Be sure to make and go to all appointments, and call your doctor if you are having problems. It's also a good idea to know your test results and keep a list of the medicines you take. How can you care for yourself at home? · Do not eat too much sugar, fat, or fast foods. You can still have dessert and treats now and then. The goal is moderation. · Start small to improve your eating habits. Pay attention to portion sizes, drink less juice and soda pop, and eat more fruits and vegetables. ¨ Eat a healthy amount of food. A 3-ounce serving of meat, for example, is about the size of a deck of cards. Fill the rest of your plate with vegetables and whole grains. ¨ Limit the amount of soda and sports drinks you have every day. Drink more water when you are thirsty. ¨ Eat at least 5 servings of fruits and vegetables every day. It may seem like a lot, but it is not hard to reach this goal. A serving or helping is 1 piece of fruit, 1 cup of vegetables, or 2 cups of leafy, raw vegetables. Have an apple or some carrot sticks as an afternoon snack instead of a candy bar. Try to have fruits and/or vegetables at every meal.  · Make exercise part of your daily routine. You may want to start with simple activities, such as walking, bicycling, or slow swimming. Try to be active 30 to 60 minutes every day. You do not need to do all 30 to 60 minutes all at once.  For example, you can exercise 3 times a day for 10 or 20 minutes. Moderate exercise is safe for most people, but it is always a good idea to talk to your doctor before starting an exercise program.  · Keep moving. Peg Cobos the lawn, work in the garden, or [employer]. Take the stairs instead of the elevator at work. · If you smoke, quit. People who smoke have an increased risk for heart attack, stroke, cancer, and other lung illnesses. Quitting is hard, but there are ways to boost your chance of quitting tobacco for good. ¨ Use nicotine gum, patches, or lozenges. ¨ Ask your doctor about stop-smoking programs and medicines. ¨ Keep trying. In addition to reducing your risk of diseases in the future, you will notice some benefits soon after you stop using tobacco. If you have shortness of breath or asthma symptoms, they will likely get better within a few weeks after you quit. · Limit how much alcohol you drink. Moderate amounts of alcohol (up to 2 drinks a day for men, 1 drink a day for women) are okay. But drinking too much can lead to liver problems, high blood pressure, and other health problems. Family health  If you have a family, there are many things you can do together to improve your health. · Eat meals together as a family as often as possible. · Eat healthy foods. This includes fruits, vegetables, lean meats and dairy, and whole grains. · Include your family in your fitness plan. Most people think of activities such as jogging or tennis as the way to fitness, but there are many ways you and your family can be more active. Anything that makes you breathe hard and gets your heart pumping is exercise. Here are some tips:  ¨ Walk to do errands or to take your child to school or the bus. ¨ Go for a family bike ride after dinner instead of watching TV. Where can you learn more? Go to http://mansoor-eugene.info/. Enter M426 in the search box to learn more about \"A Healthy Lifestyle: Care Instructions. \"  Current as of: May 12, 2017  Content Version: 11.4  © 7067-5863 [employer]wise, [employer]. Care instructions adapted under license by [employer] (which disclaims liability or warranty for this information). If you have questions about a medical condition or this instruction, always ask your healthcare professional. [address] any warranty or liability for your use of this information. Please be advised there is a $25 fee for all paperwork to be completed from our  providers. This is to be paid by the patient prior to picking up the completed forms.

## 2018-03-05 ENCOUNTER — DOCUMENTATION ONLY (OUTPATIENT)
Dept: NEUROLOGY | Age: 38
End: 2018-03-05

## 2018-03-05 NOTE — PROGRESS NOTES
Faxed on 2/20/2018 to [address] [place] [address] Referral Requisition, Progress Notes, Insurance Image, Patient Registration.

## 2018-04-10 ENCOUNTER — TELEPHONE (OUTPATIENT)
Dept: NEUROLOGY | Age: 38
End: 2018-04-10

## 2018-04-10 DIAGNOSIS — M54.10 RADICULAR PAIN: ICD-10-CM

## 2018-04-10 RX ORDER — CYCLOBENZAPRINE HCL 10 MG
10 TABLET ORAL
Qty: 30 TAB | Refills: 2 | Status: SHIPPED | OUTPATIENT
Start: 2018-04-10 | End: 2018-05-22 | Stop reason: SDUPTHER

## 2018-04-11 DIAGNOSIS — M54.10 RADICULAR PAIN: ICD-10-CM

## 2018-04-12 RX ORDER — CYCLOBENZAPRINE HCL 10 MG
TABLET ORAL
Qty: 45 TAB | Refills: 0 | Status: SHIPPED | OUTPATIENT
Start: 2018-04-12 | End: 2019-10-22

## 2018-05-22 DIAGNOSIS — M54.10 RADICULAR PAIN: ICD-10-CM

## 2018-05-22 RX ORDER — CYCLOBENZAPRINE HCL 10 MG
TABLET ORAL
Qty: 30 TAB | Refills: 0 | Status: SHIPPED | OUTPATIENT
Start: 2018-05-22 | End: 2019-10-22

## 2018-05-31 DIAGNOSIS — M54.10 RADICULAR PAIN: ICD-10-CM

## 2018-05-31 RX ORDER — CYCLOBENZAPRINE HCL 10 MG
TABLET ORAL
Qty: 30 TAB | Refills: 0 | OUTPATIENT
Start: 2018-05-31

## 2018-07-02 ENCOUNTER — OFFICE VISIT (OUTPATIENT)
Dept: INTERNAL MEDICINE CLINIC | Age: 38
End: 2018-07-02

## 2018-07-02 ENCOUNTER — HOSPITAL ENCOUNTER (OUTPATIENT)
Dept: LAB | Age: 38
Discharge: HOME | End: 2018-07-02
Payer: COMMERCIAL

## 2018-07-02 VITALS
OXYGEN SATURATION: 100 % | HEART RATE: 90 BPM | WEIGHT: 191.4 LBS | RESPIRATION RATE: 16 BRPM | HEIGHT: 60 IN | DIASTOLIC BLOOD PRESSURE: 72 MMHG | TEMPERATURE: 98.2 F | BODY MASS INDEX: 37.58 KG/M2 | SYSTOLIC BLOOD PRESSURE: 118 MMHG

## 2018-07-02 DIAGNOSIS — N89.8 VAGINAL DISCHARGE: ICD-10-CM

## 2018-07-02 DIAGNOSIS — R10.31 ABDOMINAL DISCOMFORT IN RIGHT LOWER QUADRANT: ICD-10-CM

## 2018-07-02 DIAGNOSIS — Z12.4 PAP SMEAR FOR CERVICAL CANCER SCREENING: Primary | ICD-10-CM

## 2018-07-02 DIAGNOSIS — N39.3 STRESS INCONTINENCE: ICD-10-CM

## 2018-07-02 PROCEDURE — 87624 HPV HI-RISK TYP POOLED RSLT: CPT | Performed by: FAMILY MEDICINE

## 2018-07-02 PROCEDURE — 88175 CYTOPATH C/V AUTO FLUID REDO: CPT | Performed by: FAMILY MEDICINE

## 2018-07-02 RX ORDER — HYDROCODONE BITARTRATE AND ACETAMINOPHEN 5; 325 MG/1; MG/1
TABLET ORAL
COMMUNITY

## 2018-07-02 NOTE — PROGRESS NOTES
SUBJECTIVE:   Ulises Martinez is a 45 y.o. female who is here for a pap. Pt denies vaginal irritation or discharge, CP, or SOB. At this time, she is otherwise doing well and has brought no other complaints to my attention today. For a list of the medical issues addressed today, see the assessment and plan below. PMH:   Past Medical History:   Diagnosis Date    Chronic pain     low back    Uterine fibroid        PSH:  has a past surgical history that includes hx other surgical; hx tubal ligation; hx  section (); pr myomectomy 1-4,w/tot 250gms/<,abd apprch (); and hx  section (). Allergies: is allergic to pcn [penicillins]; pork/porcine containing products; shellfish containing products; and zoloft [sertraline]. Meds:   Current Outpatient Prescriptions   Medication Sig    HYDROcodone-acetaminophen (NORCO) 5-325 mg per tablet Take  by mouth.  cyclobenzaprine (FLEXERIL) 10 mg tablet TAKE 1 TABLET BY MOUTH THREE TIMES DAILY AS NEEDED FOR MUSCLE SPASMS    busPIRone (BUSPAR) 7.5 mg tablet TAKE 1 TABLET BY MOUTH THREE TIMES DAILY    cyclobenzaprine (FLEXERIL) 10 mg tablet TAKE 1 TAB BY MOUTH THREE (3) TIMES DAILY AS NEEDED FOR MUSCLE SPASM(S) FOR UP TO 14 DAYS.  zolpidem (AMBIEN) 5 mg tablet Take 1 Tab by mouth nightly as needed for Sleep. Max Daily Amount: 5 mg. No current facility-administered medications for this visit. Fam hx: family history includes Alcohol abuse in her father; Asthma in her mother; Diabetes in her maternal aunt, maternal grandmother, and mother; Elevated Lipids in her mother; Liver Disease in her father; No Known Problems in her son and son; Other in her sister and another family member. There is no history of Anesth Problems. Soc hx:  reports that she quit smoking about 17 months ago. Her smoking use included Cigarettes. She has a 3.75 pack-year smoking history.  She uses smokeless tobacco. She reports that she does not drink alcohol or use illicit drugs. Review of Systems - History obtained from the patient  General ROS: negative  Psychological ROS: negative  Ophthalmic ROS: negative  ENT ROS: negative  Respiratory ROS: no cough, shortness of breath, or wheezing  Cardiovascular ROS: no chest pain or dyspnea on exertion  Gastrointestinal ROS: no abdominal pain, change in bowel habits, or black or bloody stools  Genito-Urinary ROS: negative  Musculoskeletal ROS: negative  Neurological ROS: negative  Dermatological ROS: negative    OBJECTIVE:   Vitals:   Visit Vitals    /72 (BP 1 Location: Right arm, BP Patient Position: Sitting)    Pulse 90    Temp 98.2 °F (36.8 °C) (Oral)    Resp 16    Ht 5' (1.524 m)    Wt 191 lb 6.4 oz (86.8 kg)    LMP 08/08/2016    SpO2 100%    BMI 37.38 kg/m2     Gen: Pleasant 45 y.o. female in NAD. HEENT: PERRLA. EOMI. OP moist and pink. EARS: TMs normal and canals equal bilaterally. NECK: Supple. No LAD. No thyromegaly. HEART: RRR, No M/G/R.     LUNGS: CTAB No W/R. ABDOMEN: +RLQ discomfort S, ND, BS+. EXTREMITIES: Warm. No C/C/E.    MUSCULOSKELETAL: Normal ROM, muscle strength 5/5 all groups. NEURO: Alert and oriented x 3. Cranial nerves grossly intact. No focal sensory or motor deficits noted. SKIN: Warm. Dry. No rashes or other lesions noted. Female : +white discharge normal external genitalia, no discharge, no lesions, no masses, partial hysterectomy with ovaries intact    ASSESSMENT/ PLAN:     Diagnoses and all orders for this visit:    1. Pap smear for cervical cancer screening  -     PAP (IMAGE GUIDED) + HPV HIGH RISK    2. Stress incontinence  -     REFERRAL TO FEMALE PELVIC MEDICINE AND RECONSTRUCTIVE SURGERY    3. Abdominal discomfort in right lower quadrant  -     METABOLIC PANEL, COMPREHENSIVE  -     CBC WITH AUTOMATED DIFF      1. Pap smear for cervical cancer screening  I performed a pap in the office today.      2. Stress incontinence  I referred pt to uro-gyn for pelvic floor strengthening following partial hysterectomy. 3. RLQ discomfort  I ordered labs for CMP and CBC for further assessment. Follow-up Disposition:  Return if symptoms worsen or fail to improve. I have reviewed the patient's medications and risks/side effects/benefits were discussed. Diagnosis(-es) explained to patient and questions answered. Literature provided where appropriate.      Written by Marcio Swanson, as dictated by Wes Fenton MD.

## 2018-07-02 NOTE — LETTER
7/10/2018 11:02 AM 
 
 
 
Ms. Montgomery Been [place] [id number] Dear Ulises Been: 
 
Please find your most recent results below. Resulted Orders CX-VAG CYTOLOGY Narrative Primo [address] [place] [address] St         [facility], [address]     [address], [address] [facility] 
[phone] Fax# [phone]    Fax# ([phone]      Fax# [phone] 
 
========================================================================== 
                       * * * CYTOPATHOLOGY REPORT* * *   
========================================================================== 
GYNECOLOGICAL * * *Procedures/Addenda Present * * * Patient:  Mary Jane Nielson             Specimen #:  [id number] Age:  1980 (Age: 45)                Date of Procedure: 7/2/2018 Sex:  F                                  Date of Receipt:  7/3/2018 Hospital#:  [id number]               Date of Report: 7/6/2018 Med. Record #:  [id number] Location:  [address] Physician(s):  Kashif Vernon MD ([id number]) * * * CLINICAL HISTORY* * * Menstrual History: Hysterectomy-Supra-Cervical 
HPV REGARDLESS:  
YES  
SOURCE: 
A: Cervical/Endocervical Imaged Processed Thin Prep 
 
 
============================================================================ 
                                * * * FINAL INTERPRETATION* * * Cervical/Endocervical Imaged Processed Thin Prep Satisfactory for evaluation. NEGATIVE FOR INTRAEPITHELIAL LESION OR MALIGNANCY. Sana Risk,  CT (AS Vikas Blount HPV HR Interpretation Test: HPV, high-risk Result: NEGATIVE Reference Interval: Negative This high-risk HPV test detects fourteen high-risk types 
(16/18/31/33/35/39/45/51/52/56/58/59/66/68) without differentiation, using 
nucleic acid amplification method. Test performed by: [address] [place] [address] [place]  Dr. Shereen Burkett [address] [facility], [facility] Phone number:  [phone] Suzanne Higuera * * *Electronically Signed* * * 
7/6/2018 ICD10 Codes: 
 Z12.4 The Pap test is a screening procedure to aid in the detection of cervical 
cancer and its precursors. It should not be used as the sole means of 
detecting cervical cancer. As with any laboratory test, false negative 
and false positive results are known to occur. RECOMMENDATIONS: 
Pap smear and nuswab are normal.  
 
Please call me if you have any questions: [phone] Sincerely, 
 
Dr. Frank Steen MD

## 2018-07-02 NOTE — MR AVS SNAPSHOT
E[address]al[address] [facility] 
[phone] Patient: Ulises Martinez MRN: [id number] QMQ:[id number]/[id number] Visit Information Date & Time Provider Department Dept. Phone Encounter #  
 7/2/2018  1:30 PM Ephraim Mas, [address] [place] [address] [phone] [id number] Follow-up Instructions Return if symptoms worsen or fail to improve. Upcoming Health Maintenance Date Due  
 PAP AKA CERVICAL CYTOLOGY 1/18/2001 Influenza Age 5 to Adult 8/1/2018 DTaP/Tdap/Td series (2 - Td) 3/2/2027 Allergies as of 7/2/2018  Review Complete On: 7/2/2018 By: Ephraim Mas MD  
  
 Severity Noted Reaction Type Reactions Pcn [Penicillins]  06/24/2012    Rash Pork/porcine Containing Products  01/20/2015    Rash Shellfish Containing Products  01/20/2015    Rash  
 Zoloft [Sertraline]  11/16/2017    Other (comments) Dysphoria, insomnia Current Immunizations  Never Reviewed No immunizations on file. Not reviewed this visit You Were Diagnosed With   
  
 Codes Comments Pap smear for cervical cancer screening    -  Primary ICD-10-CM: Z12.4 ICD-9-CM: V76.2 Stress incontinence     ICD-10-CM: N39.3 ICD-9-CM: NTC4520 Abdominal discomfort in right lower quadrant     ICD-10-CM: R10.31 ICD-9-CM: 789.03 Vitals BP Pulse Temp Resp Height(growth percentile) Weight(growth percentile) 118/72 (BP 1 Location: Right arm, BP Patient Position: Sitting) 90 98.2 °F (36.8 °C) (Oral) 16 5' (1.524 m) 191 lb 6.4 oz (86.8 kg) LMP SpO2 BMI OB Status Smoking Status 08/08/2016 100% 37.38 kg/m2 Hysterectomy Former Smoker BMI and BSA Data Body Mass Index Body Surface Area  
 37.38 kg/m 2 1.92 m 2 Preferred Pharmacy Pharmacy Name Phone [facility] [address] AT [address] [place] [address] Drive [phone] Your Updated Medication List  
  
   
This list is accurate as of 7/2/18  2:29 PM.  Always use your most recent med list.  
  
  
  
  
 busPIRone 7.5 mg tablet Commonly known as:  BUSPAR  
TAKE 1 TABLET BY MOUTH THREE TIMES DAILY * cyclobenzaprine 10 mg tablet Commonly known as:  FLEXERIL  
TAKE 1 TAB BY MOUTH THREE (3) TIMES DAILY AS NEEDED FOR MUSCLE SPASM(S) FOR UP TO 14 DAYS. * cyclobenzaprine 10 mg tablet Commonly known as:  FLEXERIL  
TAKE 1 TABLET BY MOUTH THREE TIMES DAILY AS NEEDED FOR MUSCLE SPASMS HYDROcodone-acetaminophen 5-325 mg per tablet Commonly known as:  [place] Take  by mouth.  
  
 zolpidem 5 mg tablet Commonly known as:  AMBIEN Take 1 Tab by mouth nightly as needed for Sleep. Max Daily Amount: 5 mg.  
  
 * Notice: This list has 2 medication(s) that are the same as other medications prescribed for you. Read the directions carefully, and ask your doctor or other care provider to review them with you. We Performed the Following CBC WITH AUTOMATED DIFF [36970 CPT(R)] METABOLIC PANEL, COMPREHENSIVE [36702 CPT(R)]   
 PAP (IMAGE GUIDED) + HPV HIGH RISK [[id number] Custom] REFERRAL TO FEMALE PELVIC MEDICINE AND RECONSTRUCTIVE SURGERY [[id number] Custom] Follow-up Instructions Return if symptoms worsen or fail to improve. Referral Information Referral ID Referred By Referred To  
  
 [id number] [place], [facility] Ernesto Salter MD   
   [address] [facility], [facility] [address] Phone: [phone] Fax: [phone] Visits Status Start Date End Date 1 New Request 7/2/18 7/2/19 If your referral has a status of pending review or denied, additional information will be sent to support the outcome of this decision. Introducing [facility]! Dear Collin Robert: 
Thank you for requesting a app[employer]obi account.   Our records indicate that you already have an active [employer] account. You can access your account anytime at [email or web address] Did you know that you can access your hospital and ER discharge instructions at any time in [employer]? You can also review all of your test results from your hospital stay or ER visit. Additional Information If you have questions, please visit the Frequently Asked Questions section of the [employer] website at [email or web address] Remember, [employer] is NOT to be used for urgent needs. For medical emergencies, dial 911. Now available from your iPhone and Android! Please provide this summary of care documentation to your next provider. Your primary care clinician is listed as Kala Garcia. If you have any questions after today's visit, please call [phone].

## 2018-07-02 NOTE — PROGRESS NOTES
Francine Chew is a 45 y.o. female    Pt states that when you she is experiencing a dry cough that cause right flank pain \"like a tug\" feeling. Started to \"vap\" a year ago. Smokeless tobacco    1. Have you been to the ER, urgent care clinic since your last visit? Hospitalized since your last visit? No    2. Have you seen or consulted any other health care providers outside of the [address] [place] [address] since your last visit? Include any pap smears or colon screening. Yes, the [facility] with Dr. Gregoria Solis and [facility] physical therapy Dr. Nevaeh Downing. Left arm and neck area of spine.

## 2018-07-03 LAB
ALBUMIN SERPL-MCNC: 4.4 G/DL (ref 3.5–5.5)
ALBUMIN/GLOB SERPL: 1.5 {RATIO} (ref 1.2–2.2)
ALP SERPL-CCNC: 74 IU/L (ref 39–117)
ALT SERPL-CCNC: 11 IU/L (ref 0–32)
AST SERPL-CCNC: 12 IU/L (ref 0–40)
BASOPHILS # BLD AUTO: 0 X10E3/UL (ref 0–0.2)
BASOPHILS NFR BLD AUTO: 0 %
BILIRUB SERPL-MCNC: 0.2 MG/DL (ref 0–1.2)
BUN SERPL-MCNC: 9 MG/DL (ref 6–20)
BUN/CREAT SERPL: 12 (ref 9–23)
CALCIUM SERPL-MCNC: 9.8 MG/DL (ref 8.7–10.2)
CHLORIDE SERPL-SCNC: 105 MMOL/L (ref 96–106)
CO2 SERPL-SCNC: 19 MMOL/L (ref 20–29)
CREAT SERPL-MCNC: 0.75 MG/DL (ref 0.57–1)
EOSINOPHIL # BLD AUTO: 0.1 X10E3/UL (ref 0–0.4)
EOSINOPHIL NFR BLD AUTO: 1 %
ERYTHROCYTE [DISTWIDTH] IN BLOOD BY AUTOMATED COUNT: 12.6 % (ref 12.3–15.4)
GLOBULIN SER CALC-MCNC: 3 G/DL (ref 1.5–4.5)
GLUCOSE SERPL-MCNC: 91 MG/DL (ref 65–99)
HCT VFR BLD AUTO: 37 % (ref 34–46.6)
HGB BLD-MCNC: 12.1 G/DL (ref 11.1–15.9)
IMM GRANULOCYTES # BLD: 0 X10E3/UL (ref 0–0.1)
IMM GRANULOCYTES NFR BLD: 0 %
LYMPHOCYTES # BLD AUTO: 2.3 X10E3/UL (ref 0.7–3.1)
LYMPHOCYTES NFR BLD AUTO: 31 %
MCH RBC QN AUTO: 30.4 PG (ref 26.6–33)
MCHC RBC AUTO-ENTMCNC: 32.7 G/DL (ref 31.5–35.7)
MCV RBC AUTO: 93 FL (ref 79–97)
MONOCYTES # BLD AUTO: 0.4 X10E3/UL (ref 0.1–0.9)
MONOCYTES NFR BLD AUTO: 5 %
NEUTROPHILS # BLD AUTO: 4.6 X10E3/UL (ref 1.4–7)
NEUTROPHILS NFR BLD AUTO: 63 %
PLATELET # BLD AUTO: 360 X10E3/UL (ref 150–379)
POTASSIUM SERPL-SCNC: 4.2 MMOL/L (ref 3.5–5.2)
PROT SERPL-MCNC: 7.4 G/DL (ref 6–8.5)
RBC # BLD AUTO: 3.98 X10E6/UL (ref 3.77–5.28)
SODIUM SERPL-SCNC: 138 MMOL/L (ref 134–144)
WBC # BLD AUTO: 7.4 X10E3/UL (ref 3.4–10.8)

## 2018-07-04 NOTE — PROGRESS NOTES
CMP-Normal electrolyte levels, renal, and liver function. CBC-Normal red and white blood cell levels.

## 2018-07-07 LAB
A VAGINAE DNA VAG QL NAA+PROBE: NORMAL SCORE
BVAB2 DNA VAG QL NAA+PROBE: NORMAL SCORE
C ALBICANS DNA VAG QL NAA+PROBE: NEGATIVE
C GLABRATA DNA VAG QL NAA+PROBE: NEGATIVE
C TRACH RRNA SPEC QL NAA+PROBE: NEGATIVE
MEGA1 DNA VAG QL NAA+PROBE: NORMAL SCORE
N GONORRHOEA RRNA SPEC QL NAA+PROBE: NEGATIVE
T VAGINALIS RRNA SPEC QL NAA+PROBE: NEGATIVE

## 2018-07-10 ENCOUNTER — TELEPHONE (OUTPATIENT)
Dept: INTERNAL MEDICINE CLINIC | Age: 38
End: 2018-07-10

## 2018-07-10 NOTE — TELEPHONE ENCOUNTER
Pt returned a call regarding her pap smear results from 07/02.  Best contact [phone].        Message received & copied from Jermain Conroy

## 2018-07-10 NOTE — PROGRESS NOTES
Call attempted to patient, there was no answer. Left a voice mail message requesting a return call for results review. Letter mailed.

## 2019-10-22 ENCOUNTER — OFFICE VISIT (OUTPATIENT)
Dept: URGENT CARE | Age: 39
End: 2019-10-22

## 2019-10-22 VITALS
DIASTOLIC BLOOD PRESSURE: 65 MMHG | SYSTOLIC BLOOD PRESSURE: 107 MMHG | HEART RATE: 79 BPM | TEMPERATURE: 98.8 F | BODY MASS INDEX: 36.91 KG/M2 | WEIGHT: 188 LBS | RESPIRATION RATE: 18 BRPM | OXYGEN SATURATION: 98 % | HEIGHT: 60 IN

## 2019-10-22 DIAGNOSIS — F51.01 PRIMARY INSOMNIA: ICD-10-CM

## 2019-10-22 DIAGNOSIS — R31.9 URINARY TRACT INFECTION WITH HEMATURIA, SITE UNSPECIFIED: ICD-10-CM

## 2019-10-22 DIAGNOSIS — R30.9 URINARY PAIN: Primary | ICD-10-CM

## 2019-10-22 DIAGNOSIS — N39.0 URINARY TRACT INFECTION WITH HEMATURIA, SITE UNSPECIFIED: ICD-10-CM

## 2019-10-22 LAB
BILIRUB UR QL STRIP: NEGATIVE
GLUCOSE UR-MCNC: NEGATIVE MG/DL
KETONES P FAST UR STRIP-MCNC: NEGATIVE MG/DL
PH UR STRIP: 6 [PH] (ref 4.6–8)
PROT UR QL STRIP: NEGATIVE
SP GR UR STRIP: 1.01 (ref 1–1.03)
UA UROBILINOGEN AMB POC: NORMAL (ref 0.2–1)
URINALYSIS CLARITY POC: NORMAL
URINALYSIS COLOR POC: NORMAL
URINE BLOOD POC: NORMAL
URINE LEUKOCYTES POC: NORMAL
URINE NITRITES POC: NEGATIVE

## 2019-10-22 RX ORDER — PHENAZOPYRIDINE HYDROCHLORIDE 200 MG/1
200 TABLET, FILM COATED ORAL
Qty: 9 TAB | Refills: 0 | Status: SHIPPED | OUTPATIENT
Start: 2019-10-22 | End: 2019-10-25

## 2019-10-22 RX ORDER — NITROFURANTOIN (MACROCRYSTALS) 100 MG/1
100 CAPSULE ORAL 2 TIMES DAILY
Qty: 14 CAP | Refills: 0 | Status: SHIPPED | OUTPATIENT
Start: 2019-10-22 | End: 2019-10-29

## 2019-10-22 NOTE — PROGRESS NOTES
The history is provided by the patient. Urinary Pain   This is a new problem. The current episode started 2 days ago. Pertinent negatives include no chest pain, no headaches and no shortness of breath. Nothing aggravates the symptoms. Nothing relieves the symptoms. She has tried nothing for the symptoms. Past Medical History:   Diagnosis Date    Chronic pain     low back    Uterine fibroid         Past Surgical History:   Procedure Laterality Date    HX  SECTION      HX  SECTION      HX OTHER SURGICAL      WISDOM TEETH EXTRACTION    HX TUBAL LIGATION      MYOMECTOMY 1-4,W/TOT 250GMS/<, [address]  2014    fibroid removal by Dr. Angeles Denson         Family History   Problem Relation Age of Onset    Diabetes Mother     Asthma Mother     Elevated Lipids Mother     Liver Disease Father         CHIRROSIS    Alcohol abuse Father     No Known Problems Son     No Known Problems Son     Other Sister         PEPTIC ULCER    Diabetes Maternal Aunt     Diabetes Maternal Grandmother     Other Other         NO ANESTHESIA PROBLEMS IN FAMILY    Anesth Problems Neg Hx         Social History     Socioeconomic History    Marital status:      Spouse name: Not on file    Number of children: Not on file    Years of education: Not on file    Highest education level: Not on file   Occupational History    Not on file   Social Needs    Financial resource strain: Not on file    Food insecurity:     Worry: Not on file     Inability: Not on file    Transportation needs:     Medical: Not on file     Non-medical: Not on file   Tobacco Use    Smoking status: Former Smoker     Packs/day: 0.25     Years: 15.00     Pack years: 3.75     Types: Cigarettes     Last attempt to quit: 1/3/2017     Years since quittin.8    Smokeless tobacco: Current User    Tobacco comment: GIVEN \"STOP SMOKING\" HANDOUT.    Substance and Sexual Activity    Alcohol use: No     Alcohol/week: 0.0 standard drinks    Drug use: No    Sexual activity: Yes     Partners: Male     Birth control/protection: Surgical     Comment:  has 2 young children   Lifestyle    Physical activity:     Days per week: Not on file     Minutes per session: Not on file    Stress: Not on file   Relationships    Social connections:     Talks on phone: Not on file     Gets together: Not on file     Attends Church service: Not on file     Active member of club or organization: Not on file     Attends meetings of clubs or organizations: Not on file     Relationship status: Not on file    Intimate partner violence:     Fear of current or ex partner: Not on file     Emotionally abused: Not on file     Physically abused: Not on file     Forced sexual activity: Not on file   Other Topics Concern    Not on file   Social History Narrative    Not on file                ALLERGIES: Food [shellfish containing products]; Pcn [penicillins]; Pork/porcine containing products; and Zoloft [sertraline]    Review of Systems   Constitutional: Negative for chills, fatigue and fever. HENT: Negative. Respiratory: Negative for shortness of breath. Cardiovascular: Negative for chest pain. Gastrointestinal: Negative for nausea and vomiting. Genitourinary: Positive for frequency and urgency. Negative for dysuria, hematuria, pelvic pain and vaginal discharge. Musculoskeletal: Positive for back pain. Skin: Negative. Neurological: Negative for dizziness, syncope, weakness, light-headedness, numbness and headaches. Vitals:    10/22/19 0819   BP: 107/65   Pulse: 79   Resp: 18   Temp: 98.8 °F (37.1 °C)   SpO2: 98%   Weight: 188 lb (85.3 kg)   Height: 5' (1.524 m)       Physical Exam   Constitutional: She is oriented to person, place, and time. She appears well-developed and well-nourished. Neck: Normal range of motion. Cardiovascular: Normal rate, regular rhythm and normal heart sounds.    Pulmonary/Chest: Effort normal and breath sounds normal.   Abdominal: Soft. Bowel sounds are normal. There is tenderness (low pelvic). There is no rebound and no guarding. Musculoskeletal: Normal range of motion. Low right lumbar discomfort   Lymphadenopathy:     She has no cervical adenopathy. Neurological: She is alert and oriented to person, place, and time. Skin: Skin is warm and dry. Psychiatric: She has a normal mood and affect. MDM     Differential Diagnosis; Clinical Impression; Plan:     (R30.9) Urinary pain  (primary encounter diagnosis)  (N39.0,  R31.9) Urinary tract infection with hematuria, site unspecified  Orders Placed This Encounter      nitrofurantoin (MACRODANTIN) 100 mg capsule          Sig: Take 1 Cap by mouth two (2) times a day for 7 days. Dispense:  14 Cap          Refill:  0      phenazopyridine (PYRIDIUM) 200 mg tablet          Sig: Take 1 Tab by mouth three (3) times daily as needed for Pain for up to 3 days. Dispense:  9 Tab          Refill:  0    Increase water intake and cranberry juice. Educational material given. The patients condition was discussed with the patient and they understand. The patient is to follow up with PCP. If signs and symptoms become worse the pt is to go to the ER. The patient is to take medications as prescribed. AVS given with patient instructions upon discharge.                   Procedures

## 2019-10-22 NOTE — PATIENT INSTRUCTIONS
Urinary Tract Infection in Women: Care Instructions  Your Care Instructions    A urinary tract infection, or UTI, is a general term for an infection anywhere between the kidneys and the urethra (where urine comes out). Most UTIs are bladder infections. They often cause pain or burning when you urinate. UTIs are caused by bacteria and can be cured with antibiotics. Be sure to complete your treatment so that the infection goes away. Follow-up care is a key part of your treatment and safety. Be sure to make and go to all appointments, and call your doctor if you are having problems. It's also a good idea to know your test results and keep a list of the medicines you take. How can you care for yourself at home? · Take your antibiotics as directed. Do not stop taking them just because you feel better. You need to take the full course of antibiotics. · Drink extra water and other fluids for the next day or two. This may help wash out the bacteria that are causing the infection. (If you have kidney, heart, or liver disease and have to limit fluids, talk with your doctor before you increase your fluid intake.)  · Avoid drinks that are carbonated or have caffeine. They can irritate the bladder. · Urinate often. Try to empty your bladder each time. · To relieve pain, take a hot bath or lay a heating pad set on low over your lower belly or genital area. Never go to sleep with a heating pad in place. To prevent UTIs  · Drink plenty of water each day. This helps you urinate often, which clears bacteria from your system. (If you have kidney, heart, or liver disease and have to limit fluids, talk with your doctor before you increase your fluid intake.)  · Urinate when you need to. · Urinate right after you have sex. · Change sanitary pads often. · Avoid douches, bubble baths, feminine hygiene sprays, and other feminine hygiene products that have deodorants.   · After going to the bathroom, wipe from front to back.  When should you call for help? Call your doctor now or seek immediate medical care if:    · Symptoms such as fever, chills, nausea, or vomiting get worse or appear for the first time.     · You have new pain in your back just below your rib cage. This is called flank pain.     · There is new blood or pus in your urine.     · You have any problems with your antibiotic medicine.    Watch closely for changes in your health, and be sure to contact your doctor if:    · You are not getting better after taking an antibiotic for 2 days.     · Your symptoms go away but then come back. Where can you learn more? Go to http://mansoor-eugene.info/. Enter B224 in the search box to learn more about \"Urinary Tract Infection in Women: Care Instructions. \"  Current as of: December 19, 2018  Content Version: 12.2  © 9500-0142 [employer]. Care instructions adapted under license by [employer] (which disclaims liability or warranty for this information). If you have questions about a medical condition or this instruction, always ask your healthcare professional. [address] any warranty or liability for your use of this information.

## 2019-10-23 ENCOUNTER — DOCUMENTATION ONLY (OUTPATIENT)
Dept: INTERNAL MEDICINE CLINIC | Age: 39
End: 2019-10-23

## 2019-10-23 RX ORDER — ZOLPIDEM TARTRATE 5 MG/1
5 TABLET ORAL
Qty: 10 TAB | Refills: 0 | Status: SHIPPED | OUTPATIENT
Start: 2019-10-23 | End: 2019-11-04 | Stop reason: SDUPTHER

## 2019-10-23 NOTE — PROGRESS NOTES
The following prescription was faxed into pt's [facility] pharmacy w/ confirmation received:     zolpidem (AMBIEN) 5 mg tablet [[id number]]     Order Details   Dose: 5 mg Route: Oral Frequency: BEDTIME PRN for Sleep   Dispense Quantity: 10 Tab Refills: 0 Fills remaining: --           Sig: Take 1 Tab by mouth nightly as needed for Sleep. Max Daily Amount: 5 mg.          Written Date: 10/23/19 Expiration Date: --     Start Date: 10/23/19 End Date: --            Ordering Provider: -- BIA #:  -- NPI:  --    Authorizing Provider: Florance Aschoff, MD BIA #:  [id number] NPI:  [id number]    Ordering User:  Florance Aschoff, MD            Diagnosis Association: Primary insomnia (F51.01)      Original Order:  zolpidem (AMBIEN) 5 mg tablet [[id number]]      Pharmacy:  [facility]/pharmacy #[id number] - [place] [address] #:  [id number]     Pharmacy Comments:  --          Fill quantity remaining:  -- Fill quantity used:  -- Next fill due: --       Outpatient Medication Detail      Disp Refills Start End    zolpidem (AMBIEN) 5 mg tablet 10 Tab 0 10/23/2019     Sig - Route: Take 1 Tab by mouth nightly as needed for Sleep.  Max Daily Amount: 5 mg. - Oral    Class: Print    Priority and Order Details     Priority Class    Routine Print    Warnings History     No Interaction Warnings Shown    Pharmacy     [facility]/PHARMACY #[id number]- [place] [address]

## 2019-10-26 LAB — BACTERIA UR CULT: ABNORMAL

## 2019-11-04 DIAGNOSIS — F51.01 PRIMARY INSOMNIA: ICD-10-CM

## 2019-11-05 NOTE — TELEPHONE ENCOUNTER
PCP: Jonathon Gonzalez MD    Last appt: 7/2/2018  No future appointments. Requested Prescriptions     Pending Prescriptions Disp Refills    zolpidem (AMBIEN) 5 mg tablet 10 Tab 0     Sig: Take 1 Tab by mouth nightly as needed for Sleep. Max Daily Amount: 5 mg. Pt has not been seen in year. Called to make an appt before sending Rx request.     Called, no answer left message to call office back.

## 2019-11-06 RX ORDER — ZOLPIDEM TARTRATE 5 MG/1
5 TABLET ORAL
Qty: 10 TAB | Refills: 0 | Status: SHIPPED | OUTPATIENT
Start: 2019-11-06 | End: 2019-11-17 | Stop reason: SDUPTHER

## 2019-11-17 DIAGNOSIS — F51.01 PRIMARY INSOMNIA: ICD-10-CM

## 2019-11-18 NOTE — TELEPHONE ENCOUNTER
PCP: Florance Aschoff, MD    Last appt: 7/2/2018  No future appointments. Requested Prescriptions     Pending Prescriptions Disp Refills    zolpidem (AMBIEN) 5 mg tablet 10 Tab 0     Sig: Take 1 Tab by mouth nightly as needed for Sleep. Max Daily Amount: 5 mg.

## 2019-11-19 RX ORDER — ZOLPIDEM TARTRATE 5 MG/1
5 TABLET ORAL
Qty: 10 TAB | Refills: 0 | Status: SHIPPED | OUTPATIENT
Start: 2019-11-19 | End: 2020-08-05

## 2020-03-19 ENCOUNTER — TELEPHONE (OUTPATIENT)
Dept: INTERNAL MEDICINE CLINIC | Age: 40
End: 2020-03-19

## 2020-03-19 NOTE — TELEPHONE ENCOUNTER
Called, no answer left message to call office back. Left msg to call back to reschedule appt for 03/19/2020.

## 2020-03-19 NOTE — TELEPHONE ENCOUNTER
[facility] Front Office Pool             General Message/Vendor Calls     Caller's first and last name:       Reason for call:   Dry cough for a few weeks and diarrhea for 3 days     Callback required yes/no and why:   Yes, if needed     Best contact number(s):   [phone]     Details to clarify the request:       Lenora Thompson

## 2020-03-19 NOTE — TELEPHONE ENCOUNTER
Reason for call:   Dry cough for a few weeks and diarrhea for 3 days     Callback required yes/no and why:   Yes, if needed     Best contact number(s):   [phone]     Details to clarify the request:       Refkristine Servin

## 2020-03-19 NOTE — TELEPHONE ENCOUNTER
Identified patient 2 identifiers verified. Patient having pain in both breast , beast have been sensitive to touch , and cyst under her on left breast that are now scaring. No lumps felt. Patient has been rescheduled in April.

## 2020-03-23 NOTE — TELEPHONE ENCOUNTER
COVID-19 Call Protocol    Caller: Leanna Section  Address: [facility] [address] [place] [address]  Date: 03/23/2020    Complaint:   Chief Complaint   Patient presents with    Cough    Diarrhea         Additional information: Pt informed she is no longer having the cough or diarrhea. Pt informed of symptoms come back to give us a call and have the possibility of doing telemedicine. Current Symptoms  Unable to speak in full sentences: Yes      If unable to speak in full sentences - notify provider immediately. Complains of Difficulty breathing: No    Fever:      No  Cough:     productive  Sore Throat:     No  Body Aches:     No          TAKE ACTION   If YES to any of the above symptoms: Notify Provider -  Provider will verify assessment and travel screen. If NO to all of the above, may proceed with scheduling appointment. DISPOSITION     Reviewed with patient that the COVID-19 pandemic is an evolving situation with rapidly changing recommendations & guidelines. Medical decisions are made based on the best information available at the time. Recommended to  stay tuned for updates published by trusted sources and to advise your PCP of any unexpected changes in clinical conditions.     Sent to ED?:      No    ED Clinic Notified: No    Patient asked to call [phone] to sign up for MyChart  not applicable    Instructed to review current guidelines on CDC:   not applicable    [email or web address] Booker  03/23/20  3:28 PM

## 2020-03-29 ENCOUNTER — PATIENT MESSAGE (OUTPATIENT)
Dept: INTERNAL MEDICINE CLINIC | Age: 40
End: 2020-03-29

## 2020-03-30 ENCOUNTER — TELEPHONE (OUTPATIENT)
Dept: INTERNAL MEDICINE CLINIC | Age: 40
End: 2020-03-30

## 2020-03-30 ENCOUNTER — VIRTUAL VISIT (OUTPATIENT)
Dept: INTERNAL MEDICINE CLINIC | Age: 40
End: 2020-03-30

## 2020-03-30 DIAGNOSIS — R30.0 DYSURIA: Primary | ICD-10-CM

## 2020-03-30 PROBLEM — Z90.710 H/O: HYSTERECTOMY: Status: ACTIVE | Noted: 2020-03-30

## 2020-03-30 NOTE — PROGRESS NOTES
Identified pt with two pt identifiers(name and ). Reviewed record in preparation for visit and have obtained necessary documentation. Chief Complaint   Patient presents with    Abdominal Pain     Pt believes she has an UTI. There were no vitals taken for this visit. Health Maintenance Due   Topic    Influenza Age 5 to Adult        Med Reconciliation: Completed    Coordination of Care Questionnaire:  :   1) Have you been to an emergency room, urgent care, or hospitalized since your last visit? If yes, where when, and reason for visit? No       2. Have seen or consulted any other health care provider since your last visit? If yes, where when, and reason for visit? No       3) Do you have an Advanced Directive/ Living Will in place? No  If yes, do we have a copy on file   If no, would you like information     Patient is accompanied by self I have received verbal consent from Tona Johnson to discuss any/all medical information while they are present in the room.

## 2020-03-30 NOTE — TELEPHONE ENCOUNTER
----- Message from Tomas Hooks sent at 3/30/2020 10:13 AM EDT -----  Regarding: / telephone  Patient return call    Caller's first and last name and relationship (if not the patient):      Best contact number(s): [phone]      Whose call is being returned:brittni       Details to clarify the request:      Tomas Hooks      Copy/paste envera

## 2020-03-31 ENCOUNTER — PATIENT MESSAGE (OUTPATIENT)
Dept: INTERNAL MEDICINE CLINIC | Age: 40
End: 2020-03-31

## 2020-03-31 RX ORDER — NITROFURANTOIN 25; 75 MG/1; MG/1
100 CAPSULE ORAL 2 TIMES DAILY
Qty: 14 CAP | Refills: 0 | Status: SHIPPED | OUTPATIENT
Start: 2020-03-31 | End: 2020-04-07

## 2020-03-31 NOTE — TELEPHONE ENCOUNTER
----- Message from Jacqueline Kidd sent at 3/31/2020 11:49 AM EDT -----  Regarding: FW: Prescription Question  Contact: [phone]    ----- Message -----  From: Minnie Joshi  Sent: 3/31/2020  10:20 AM EDT  To: JODIE WHITMANCK Nurse Cameron  Subject: Ofelia Maier Dr. Karma Snellen was wondering why my antibiotics have not been sent in to my pharmacist

## 2020-04-01 NOTE — PROGRESS NOTES
Kemi Michel is a 36 y.o. female who was seen by synchronous (real-time) audio-video technology on 3/30/2020. Consent:  She and/or her healthcare decision maker is aware that this patient-initiated Telehealth encounter is a billable service, with coverage as determined by her insurance carrier. She is aware that she may receive a bill and has provided verbal consent to proceed: Yes    I was at home while conducting this encounter. Coding Help - Use CPT Codes 54028-32324, 48877-52509 for Established and New Patients respectively, either employing EM elements or Time rules. Other codes (example consult codes) may also apply. 712  Subjective:   Kemi Michel was seen for Abdominal Pain (Pt believes she has an UTI. )  Ms. Marlon Hernandez positing with complaints of pelvic pain started Saturday. She reports strong odor from her urine, it was cloudy, and dark. She reports feeling pressure as well as burning when she is voiding. She describes a sensation of incomplete voiding. Prior to Admission medications    Medication Sig Start Date End Date Taking? Authorizing Provider   nitrofurantoin, macrocrystal-monohydrate, (MACROBID) 100 mg capsule Take 1 Cap by mouth two (2) times a day for 7 days. 3/31/20 4/7/20 Yes Palak Lawrence MD   zolpidem (AMBIEN) 5 mg tablet Take 1 Tab by mouth nightly as needed for Sleep. Max Daily Amount: 5 mg. 11/19/19  Yes Palak Lawrence MD   HYDROcodone-acetaminophen [facility]) 5-325 mg per tablet Take  by mouth.    Yes Provider, Historical     Allergies   Allergen Reactions    Food [Shellfish Containing Products] Rash    Pcn [Penicillins] Rash    Pork/Porcine Containing Products Rash    Zoloft [Sertraline] Other (comments)     Dysphoria, insomnia           Review of Systems - History obtained from the patient  General ROS: negative  Psychological ROS: negative  Ophthalmic ROS: negative  ENT ROS: negative  Respiratory ROS: no cough, shortness of breath, or wheezing  Cardiovascular ROS: no chest pain or dyspnea on exertion  Gastrointestinal ROS: no abdominal pain, change in bowel habits, or black or bloody stools  Genito-Urinary ROS: negative  Musculoskeletal ROS: negative  Neurological ROS: negative  Dermatological ROS: negative    PHYSICAL EXAMINATION:  [ INSTRUCTIONS:  \"[x]\" Indicates a positive item  \"[]\" Indicates a negative item  -- DELETE ALL ITEMS NOT EXAMINED]  Vital Signs: (As obtained by patient/caregiver at home)  Visit Vitals  LMP 08/08/2016        Constitutional: [x] Appears well-developed and well-nourished [x] No apparent distress      [] Abnormal -     Mental status: [x] Alert and awake  [x] Oriented to person/place/time [x] Able to follow commands    [] Abnormal -     Eyes:   EOM    [x]  Normal    [] Abnormal -   Sclera  [x]  Normal    [] Abnormal -          Discharge [x]  None visible   [] Abnormal -     HENT: [x] Normocephalic, atraumatic  [] Abnormal -   [x] Mouth/Throat: Mucous membranes are moist    External Ears [x] Normal  [] Abnormal -    Neck: [x] No visualized mass [] Abnormal -     Pulmonary/Chest: [x] Respiratory effort normal   [x] No visualized signs of difficulty breathing or respiratory distress        [] Abnormal -      Musculoskeletal:   [x] Normal gait with no signs of ataxia         [x] Normal range of motion of neck        [] Abnormal -     Neurological:        [x] No Facial Asymmetry (Cranial nerve 7 motor function) (limited exam due to video visit)          [x] No gaze palsy        [] Abnormal -          Skin:        [x] No significant exanthematous lesions or discoloration noted on facial skin         [] Abnormal -            Psychiatric:       [x] Normal Affect [] Abnormal -        [x] No Hallucinations    Other pertinent observable physical exam findings:-    Assessment & Plan:   Diagnoses and all orders for this visit:    1. Dysuria  Patient was encouraged to hydrate.   A prescription for Flori Ariel was sent to her pharmacy which she will take for 7 days. Also suggested she use over-the-counter cranberry pills. If she continues to have discomfort while taking the antibiotic she was advised to call the office back and we will make arrangements to get a urinalysis and culture. -     nitrofurantoin, macrocrystal-monohydrate, (MACROBID) 100 mg capsule; Take 1 Cap by mouth two (2) times a day for 7 days. We discussed the expected course, resolution and complications of the diagnosis(es) in detail. Medication risks, benefits, costs, interactions, and alternatives were discussed as indicated. I advised her to contact the office if her condition worsens, changes or fails to improve as anticipated. She expressed understanding with the diagnosis(es) and plan. Pursuant to the emergency declaration under the [facility], [facility] waiver authority and the [employer] Act, this Virtual  Visit was conducted, with patient's consent, to reduce the patient's risk of exposure to COVID-19 and provide continuity of care for an established patient. Services were provided through a video synchronous discussion virtually to substitute for in-person clinic visit.     Ancelmo Brock MD

## 2020-04-27 ENCOUNTER — TELEPHONE (OUTPATIENT)
Dept: INTERNAL MEDICINE CLINIC | Age: 40
End: 2020-04-27

## 2020-04-27 NOTE — TELEPHONE ENCOUNTER
Called, no answer left message to call office back. Left msg re: no show appt today. Also sent a no show letter.

## 2020-04-27 NOTE — LETTER
4/27/2020 [place] [id number] Dear Andria Quintanilla, We missed seeing you for a scheduled virtual appointment at [facility] with Jorge Quiñonez MD on 4/27/2020. Our goal is to offer the best possible care to our patients, so we are concerned when you are unable to keep a scheduled appointment. Please call us at [phone] so that we can reschedule if needed. We understand circumstances may arise which make it impossible for you to keep a scheduled appointment. Should this happen in the future, please call us as soon as you know the appointment will be missed. If you find it difficult to keep your appointments, please call our office and we may be able to provide the help that you need. We hope to hear from you soon. Sincerely, Jorge Quiñonez MD

## 2020-07-31 DIAGNOSIS — F51.01 PRIMARY INSOMNIA: ICD-10-CM

## 2020-08-05 RX ORDER — ZOLPIDEM TARTRATE 5 MG/1
5 TABLET ORAL
Qty: 10 TAB | Refills: 0 | Status: SHIPPED | OUTPATIENT
Start: 2020-08-05 | End: 2020-09-01

## 2020-08-30 DIAGNOSIS — F51.01 PRIMARY INSOMNIA: ICD-10-CM

## 2020-09-01 RX ORDER — ZOLPIDEM TARTRATE 5 MG/1
5 TABLET ORAL
Qty: 10 TAB | Refills: 0 | Status: SHIPPED | OUTPATIENT
Start: 2020-09-01 | End: 2020-09-18

## 2020-09-15 DIAGNOSIS — F51.01 PRIMARY INSOMNIA: ICD-10-CM

## 2020-09-18 RX ORDER — ZOLPIDEM TARTRATE 5 MG/1
5 TABLET ORAL
Qty: 10 TAB | Refills: 0 | Status: SHIPPED | OUTPATIENT
Start: 2020-09-18 | End: 2020-09-26 | Stop reason: SDUPTHER

## 2020-09-26 DIAGNOSIS — F51.01 PRIMARY INSOMNIA: ICD-10-CM

## 2020-09-28 NOTE — TELEPHONE ENCOUNTER
Future Appointments:  No future appointments. Last Appointment With Me:  4/27/2020     Requested Prescriptions     Pending Prescriptions Disp Refills    zolpidem (AMBIEN) 5 mg tablet 10 Tab 0     Sig: Take 1 Tab by mouth nightly as needed for Sleep. Max Daily Amount: 5 mg.

## 2020-10-01 RX ORDER — ZOLPIDEM TARTRATE 5 MG/1
5 TABLET ORAL
Qty: 10 TAB | Refills: 0 | Status: SHIPPED | OUTPATIENT
Start: 2020-10-01 | End: 2021-05-05 | Stop reason: SDUPTHER

## 2020-11-23 ENCOUNTER — VIRTUAL VISIT (OUTPATIENT)
Dept: INTERNAL MEDICINE CLINIC | Age: 40
End: 2020-11-23
Payer: COMMERCIAL

## 2020-11-23 DIAGNOSIS — B37.2 YEAST INFECTION OF THE SKIN: Primary | ICD-10-CM

## 2020-11-23 DIAGNOSIS — F41.9 ANXIETY AND DEPRESSION: ICD-10-CM

## 2020-11-23 DIAGNOSIS — N64.4 BREAST PAIN: ICD-10-CM

## 2020-11-23 DIAGNOSIS — R53.82 CHRONIC FATIGUE: ICD-10-CM

## 2020-11-23 DIAGNOSIS — F32.A ANXIETY AND DEPRESSION: ICD-10-CM

## 2020-11-23 PROCEDURE — 99214 OFFICE O/P EST MOD 30 MIN: CPT | Performed by: FAMILY MEDICINE

## 2020-11-23 RX ORDER — CYCLOBENZAPRINE HCL 10 MG
TABLET ORAL
COMMUNITY
Start: 2020-11-02 | End: 2021-09-23 | Stop reason: ALTCHOICE

## 2020-11-23 RX ORDER — NYSTATIN 100000 [USP'U]/G
POWDER TOPICAL 4 TIMES DAILY
Qty: 1 BOTTLE | Refills: 1 | Status: SHIPPED | OUTPATIENT
Start: 2020-11-23 | End: 2021-09-23 | Stop reason: ALTCHOICE

## 2020-11-23 RX ORDER — BUPROPION HYDROCHLORIDE 150 MG/1
150 TABLET ORAL
Qty: 30 TAB | Refills: 3 | Status: SHIPPED | OUTPATIENT
Start: 2020-11-23 | End: 2021-01-11 | Stop reason: DRUGHIGH

## 2020-11-23 RX ORDER — BUSPIRONE HYDROCHLORIDE 7.5 MG/1
7.5 TABLET ORAL 3 TIMES DAILY
Qty: 90 TAB | Refills: 3 | Status: SHIPPED | OUTPATIENT
Start: 2020-11-23 | End: 2021-09-23 | Stop reason: ALTCHOICE

## 2020-11-23 NOTE — PROGRESS NOTES
Reviewed record in preparation for visit and have obtained necessary documentation. Identified pt with two pt identifiers(name and ). Chief Complaint   Patient presents with    Depression     follow up        Health Maintenance Due   Topic Date Due    Yearly Flu Vaccine (1) 2020       Ms. Rusty Ricardo has a reminder for a \"due or due soon\" health maintenance. I have asked that she discuss this further with her primary care provider for follow-up on this health maintenance. Coordination of Care Questionnaire:  :     1) Have you been to an emergency room, urgent care clinic since your last visit? no   Hospitalized since your last visit? no             2) Have you seen or consulted any other health care providers outside of [address] [place] [address] since your last visit? no  (Include any pap smears or colon screenings in this section.)    3) In the event something were to happen to you and you were unable to speak on your behalf, do you have an Advance Directive/ Living Will in place stating your wishes? NO    Do you have an Advance Directive on file? no    4) Are you interested in receiving information on Advance Directives? NO    Patient is accompanied by self I have received verbal consent from Nilesh Freitas to discuss any/all medical information while they are present in the room.

## 2020-11-23 NOTE — PROGRESS NOTES
Madelaine Sacks is a 36 y.o. female who was seen by synchronous (real-time) audio-video technology on 11/23/2020 for Depression (follow up )      Assessment & Plan:   Diagnoses and all orders for this visit:    1. Yeast infection of the skin  -     nystatin (MYCOSTATIN) powder; Apply  to affected area four (4) times daily. 2. Anxiety and depression  -     buPROPion XL (WELLBUTRIN XL) 150 mg tablet; Take 1 Tab by mouth every morning.  -     busPIRone (BUSPAR) 7.5 mg tablet; Take 1 Tab by mouth three (3) times daily. 3. Breast pain  -     ANA M 3D SHANI W MAMMO BI SCREENING INCL CAD; Future    4. Chronic fatigue  -     METABOLIC PANEL, COMPREHENSIVE  -     CBC WITH AUTOMATED DIFF  -     T4, FREE  -     TSH 3RD GENERATION  -     VITAMIN D, 25 HYDROXY    1. Yeast infection of the skin   I prescribed Nystatin powder to be applied under bilateral breasts QID. 2. Anxiety and Depression  I prescribed Wellbutrin XL 150mg daily and Buspar 7.5mg TID daily. I also sent her the information of local counselors. 3. Breast Pain   I ordered a mammogram.      4. Chronic Fatigue   I ordered CMP, CBC, T4, TSH, and Vitamin D lab work to further evaluate the cause of her chronic fatigue. Subjective:     Patient presents virtually to discuss depression. She admits increased stress and depression ay s she is currently going through a  divorce and is caring for her children full time by herself. She is not currently taking any medications for depression. She was previously prescribed Zoloft and Buspar by Dr. Lee Singh. She states Zoloft caused panic attacks. She reports she is unsure how Buspar affected her. She denies past use of Wellbutrin. She is currently taking Ambien. She is not currently seeing a counselor, but she would like to start. She notes breast pain and malodorous perspiration underneath her bilateral breasts. She notes a pink area of pruritus underneath bilateral breasts. She reports increased fatigue. She notes she does not feel well-rested upon waking. She has a history of a hysterectomy. She denies urinary or bowel movement changes. Prior to Admission medications    Medication Sig Start Date End Date Taking? Authorizing Provider   nystatin (MYCOSTATIN) powder Apply  to affected area four (4) times daily. 20  Yes Konstantin Hendrickson MD   buPROPion XL (WELLBUTRIN XL) 150 mg tablet Take 1 Tab by mouth every morning. 20  Yes Konstantin Hendrickson MD   busPIRone (BUSPAR) 7.5 mg tablet Take 1 Tab by mouth three (3) times daily. 20  Yes Konstantin Hendrickson MD   cyclobenzaprine (FLEXERIL) 10 mg tablet TK 1 T PO D FOR SPASM 20   Provider, Historical   zolpidem (AMBIEN) 5 mg tablet Take 1 Tab by mouth nightly as needed for Sleep. Max Daily Amount: 5 mg. 10/1/20   Konstantin Hendrickson MD   HYDROcodone-acetaminophen [facility]) 5-325 mg per tablet Take  by mouth. Provider, Historical     Patient Active Problem List    Diagnosis Date Noted    H/O: hysterectomy 2020    Chronic low back pain without sciatica 2017    History of 2  sections 2016    Depression 2016     Current Outpatient Medications   Medication Sig Dispense Refill    nystatin (MYCOSTATIN) powder Apply  to affected area four (4) times daily. 1 Bottle 1    buPROPion XL (WELLBUTRIN XL) 150 mg tablet Take 1 Tab by mouth every morning. 30 Tab 3    busPIRone (BUSPAR) 7.5 mg tablet Take 1 Tab by mouth three (3) times daily. 90 Tab 3    cyclobenzaprine (FLEXERIL) 10 mg tablet TK 1 T PO D FOR SPASM      zolpidem (AMBIEN) 5 mg tablet Take 1 Tab by mouth nightly as needed for Sleep. Max Daily Amount: 5 mg. 10 Tab 0    HYDROcodone-acetaminophen (NORCO) 5-325 mg per tablet Take  by mouth.        Allergies   Allergen Reactions    Food [Shellfish Containing Products] Rash    Pcn [Penicillins] Rash    Pork/Porcine Containing Products Rash    Zoloft [Sertraline] Other (comments)     Dysphoria, insomnia     Past Medical History:   Diagnosis Date    Chronic pain     low back    Fibroids, intramural 2016    Menorrhagia with regular cycle 2016    Severe dysmenorrhea 2016    Uterine fibroid      Past Surgical History:   Procedure Laterality Date    HX  SECTION      HX  SECTION      HX OTHER SURGICAL      WISDOM TEETH EXTRACTION    HX TUBAL LIGATION      MYOMECTOMY 1-4,W/TOT 250GMS/<, [facility]      fibroid removal by Dr. Carolyn Wagoner     Family History   Problem Relation Age of Onset    Diabetes Mother     Asthma Mother     Elevated Lipids Mother     Liver Disease Father         CHIRROSIS    Alcohol abuse Father     No Known Problems Son     No Known Problems Son     Other Sister         PEPTIC ULCER    Diabetes Maternal Aunt     Diabetes Maternal Grandmother     Other Other         NO ANESTHESIA PROBLEMS IN FAMILY    Anesth Problems Neg Hx      Social History     Tobacco Use    Smoking status: Former Smoker     Packs/day: 0.25     Years: 15.00     Pack years: 3.75     Types: Cigarettes     Last attempt to quit: 1/3/2017     Years since quitting: 3.8    Smokeless tobacco: Current User    Tobacco comment: GIVEN \"STOP SMOKING\" HANDOUT. Substance Use Topics    Alcohol use: No     Alcohol/week: 0.0 standard drinks       Review of Systems   Constitutional: Positive for malaise/fatigue. HENT: Negative. Respiratory: Negative for shortness of breath. Cardiovascular: Negative for chest pain. Gastrointestinal: Negative. Genitourinary: Negative. Skin: Positive for rash. Psychiatric/Behavioral: Positive for depression. Objective:   No flowsheet data found.      [INSTRUCTIONS:  \"[x]\" Indicates a positive item  \"[]\" Indicates a negative item  -- DELETE ALL ITEMS NOT EXAMINED]    Constitutional: [x] Appears well-developed and well-nourished [x] No apparent distress      [] Abnormal -     Mental status: [x] Alert and awake  [x] Oriented to person/place/time [x] Able to follow commands    [] Abnormal -     Eyes:   EOM    [x]  Normal    [] Abnormal -   Sclera  [x]  Normal    [] Abnormal -          Discharge [x]  None visible   [] Abnormal -     HENT: [x] Normocephalic, atraumatic  [] Abnormal -   [x] Mouth/Throat: Mucous membranes are moist    External Ears [x] Normal  [] Abnormal -    Neck: [x] No visualized mass [] Abnormal -     Pulmonary/Chest: [x] Respiratory effort normal   [x] No visualized signs of difficulty breathing or respiratory distress        [] Abnormal -      Musculoskeletal:   [x] Normal gait with no signs of ataxia         [x] Normal range of motion of neck        [] Abnormal -     Neurological:        [x] No Facial Asymmetry (Cranial nerve 7 motor function) (limited exam due to video visit)          [x] No gaze palsy        [] Abnormal -          Skin:        [x] No significant exanthematous lesions or discoloration noted on facial skin         [] Abnormal -            Psychiatric:       [x] Normal Affect [] Abnormal -        [x] No Hallucinations    Other pertinent observable physical exam findings:-        We discussed the expected course, resolution and complications of the diagnosis(es) in detail. Medication risks, benefits, costs, interactions, and alternatives were discussed as indicated. I advised her to contact the office if her condition worsens, changes or fails to improve as anticipated. She expressed understanding with the diagnosis(es) and plan. Vira Macias, who was evaluated through a patient-initiated, synchronous (real-time) audio-video encounter, and/or her healthcare decision maker, is aware that it is a billable service, with coverage as determined by her insurance carrier. She provided verbal consent to proceed: Yes, and patient identification was verified.  It was conducted pursuant to the emergency declaration under the [facility] waiver authority and the Coronavirus Preparedness and Response Supplemental Appropriations Act. A caregiver was present when appropriate. Ability to conduct physical exam was limited. I was in the office. The patient was at home.       Mackenzie Beltran

## 2020-12-07 ENCOUNTER — VIRTUAL VISIT (OUTPATIENT)
Dept: INTERNAL MEDICINE CLINIC | Age: 40
End: 2020-12-07
Payer: COMMERCIAL

## 2020-12-07 DIAGNOSIS — F32.A ANXIETY AND DEPRESSION: Primary | ICD-10-CM

## 2020-12-07 DIAGNOSIS — F41.9 ANXIETY AND DEPRESSION: Primary | ICD-10-CM

## 2020-12-07 PROCEDURE — 99213 OFFICE O/P EST LOW 20 MIN: CPT | Performed by: FAMILY MEDICINE

## 2020-12-07 NOTE — PROGRESS NOTES
Roly Farrell is a 36 y.o. female who was seen by synchronous (real-time) audio-video technology on 2020 for Depression (2 week follow up)      Assessment & Plan:   Diagnoses and all orders for this visit:    1. Anxiety and depression      I advised her to increase her dose of Wellbutrin to 300 mg by taking 2 tablets of 150 mg and to let me know on 2020 if the increased dose is agreeable with her on My Chart. I recommended a 1 month f/u. Subjective:     Patient presents virtually today for a 2 week f/u for depression and anxiety management with medication. Anxiety: Pt reports that Wellbutrin  mg tablet every day and Buspar 7.5 mg tablet TID has led to improvements in her anxiety and depression. She endorses a decreased sense of sadness, less crying, and a balance between her anxiety and depression. She notes the only side effects she felt were xerostomia and HA. Pt is agreeable to increasing the dose if recommended. Prior to Admission medications    Medication Sig Start Date End Date Taking? Authorizing Provider   cyclobenzaprine (FLEXERIL) 10 mg tablet TK 1 T PO D FOR SPASM 20  Yes Provider, Historical   nystatin (MYCOSTATIN) powder Apply  to affected area four (4) times daily. 20  Yes Boyd Muñoz MD   buPROPion XL (WELLBUTRIN XL) 150 mg tablet Take 1 Tab by mouth every morning. 20  Yes Boyd Muñoz MD   busPIRone (BUSPAR) 7.5 mg tablet Take 1 Tab by mouth three (3) times daily. 20  Yes Boyd Muñoz MD   zolpidem (AMBIEN) 5 mg tablet Take 1 Tab by mouth nightly as needed for Sleep. Max Daily Amount: 5 mg. 10/1/20  Yes Boyd Muñoz MD   HYDROcodone-acetaminophen [facility]) 5-325 mg per tablet Take  by mouth.    Yes Provider, Historical     Patient Active Problem List    Diagnosis Date Noted    H/O: hysterectomy 2020    Chronic low back pain without sciatica 2017    History of 2  sections 2016    Depression 2016 Current Outpatient Medications   Medication Sig Dispense Refill    cyclobenzaprine (FLEXERIL) 10 mg tablet TK 1 T PO D FOR SPASM      nystatin (MYCOSTATIN) powder Apply  to affected area four (4) times daily. 1 Bottle 1    buPROPion XL (WELLBUTRIN XL) 150 mg tablet Take 1 Tab by mouth every morning. 30 Tab 3    busPIRone (BUSPAR) 7.5 mg tablet Take 1 Tab by mouth three (3) times daily. 90 Tab 3    zolpidem (AMBIEN) 5 mg tablet Take 1 Tab by mouth nightly as needed for Sleep. Max Daily Amount: 5 mg. 10 Tab 0    HYDROcodone-acetaminophen (NORCO) 5-325 mg per tablet Take  by mouth.        Allergies   Allergen Reactions    Food [Shellfish Containing Products] Rash    Pcn [Penicillins] Rash    Pork/Porcine Containing Products Rash    Zoloft [Sertraline] Other (comments)     Dysphoria, insomnia     Past Medical History:   Diagnosis Date    Chronic pain     low back    Fibroids, intramural 2016    Menorrhagia with regular cycle 2016    Severe dysmenorrhea 2016    Uterine fibroid      Past Surgical History:   Procedure Laterality Date    HX  SECTION      HX  SECTION      HX OTHER SURGICAL      WISDOM TEETH EXTRACTION    HX TUBAL LIGATION      MYOMECTOMY 1-4,W[facility]TOT 250GMS/<,[facility]  2014    fibroid removal by Dr. Isabel Saab     Family History   Problem Relation Age of Onset    Diabetes Mother     Asthma Mother     Elevated Lipids Mother     Liver Disease Father         CHIRROSIS    Alcohol abuse Father     No Known Problems Son     No Known Problems Son     Other Sister         PEPTIC ULCER    Diabetes Maternal Aunt     Diabetes Maternal Grandmother     Other Other         NO ANESTHESIA PROBLEMS IN FAMILY    Anesth Problems Neg Hx      Social History     Tobacco Use    Smoking status: Former Smoker     Packs/day: 0.25     Years: 15.00     Pack years: 3.75     Types: Cigarettes     Last attempt to quit: 1/3/2017     Years since quitting: 3.9    Smokeless tobacco: Current User    Tobacco comment: GIVEN \"STOP SMOKING\" HANDOUT. Substance Use Topics    Alcohol use: No     Alcohol/week: 0.0 standard drinks       Review of Systems   Respiratory: Negative for shortness of breath. Cardiovascular: Negative for chest pain. Psychiatric/Behavioral: Positive for depression. The patient is nervous/anxious. Objective:   No flowsheet data found. [INSTRUCTIONS:  \"[x]\" Indicates a positive item  \"[]\" Indicates a negative item  -- DELETE ALL ITEMS NOT EXAMINED]    Constitutional: [x] Appears well-developed and well-nourished [x] No apparent distress      [] Abnormal -     Mental status: [x] Alert and awake  [x] Oriented to person/place/time [x] Able to follow commands    [] Abnormal -     Eyes:   EOM    [x]  Normal    [] Abnormal -   Sclera  [x]  Normal    [] Abnormal -          Discharge [x]  None visible   [] Abnormal -     HENT: [x] Normocephalic, atraumatic  [] Abnormal -   [x] Mouth/Throat: Mucous membranes are moist    External Ears [x] Normal  [] Abnormal -    Neck: [x] No visualized mass [] Abnormal -     Pulmonary/Chest: [x] Respiratory effort normal   [x] No visualized signs of difficulty breathing or respiratory distress        [] Abnormal -      Musculoskeletal:   [x] Normal gait with no signs of ataxia         [x] Normal range of motion of neck        [] Abnormal -     Neurological:        [x] No Facial Asymmetry (Cranial nerve 7 motor function) (limited exam due to video visit)          [x] No gaze palsy        [] Abnormal -          Skin:        [x] No significant exanthematous lesions or discoloration noted on facial skin         [] Abnormal -            Psychiatric:       [x] Normal Affect [] Abnormal -        [x] No Hallucinations    Other pertinent observable physical exam findings:-    We discussed the expected course, resolution and complications of the diagnosis(es) in detail.   Medication risks, benefits, costs, interactions, and alternatives were discussed as indicated. I advised her to contact the office if her condition worsens, changes or fails to improve as anticipated. She expressed understanding with the diagnosis(es) and plan. Tushar Reyes, who was evaluated through a patient-initiated, synchronous (real-time) audio-video encounter, and/or her healthcare decision maker, is aware that it is a billable service, with coverage as determined by her insurance carrier. She provided verbal consent to proceed: Yes, and patient identification was verified. It was conducted pursuant to the emergency declaration under the [address] [place] [address], [address] [place] [address] authority and the [employer] Act. A caregiver was present when appropriate. Ability to conduct physical exam was limited. I was in the office. The patient was at home.       Omayra Lee, as dictated by Glen Wynn MD.

## 2020-12-18 ENCOUNTER — TELEPHONE (OUTPATIENT)
Dept: INTERNAL MEDICINE CLINIC | Age: 40
End: 2020-12-18

## 2020-12-18 DIAGNOSIS — R30.0 DYSURIA: Primary | ICD-10-CM

## 2020-12-18 RX ORDER — CIPROFLOXACIN 500 MG/1
500 TABLET ORAL 2 TIMES DAILY
Qty: 14 TAB | Refills: 0 | Status: SHIPPED | OUTPATIENT
Start: 2020-12-18 | End: 2021-09-23 | Stop reason: ALTCHOICE

## 2020-12-18 NOTE — TELEPHONE ENCOUNTER
----- Message from Christiano Bone sent at 12/18/2020 10:41 AM EST -----  Regarding: Dr. Stoner  / telephone  Level 1/Escalated Issue      Caller's first and last name and relationship (if not the patient): NA      Best contact number(s): [phone]      What are the symptoms: Complains of UTI Symptoms ,Painful urination / odor       Transfer successful - yes/no (include outcome): No- patient misunderstood that I was going to transfer and disconnected the call. Transfer declined - yes/no (include reason): No- Patient misunderstood that I was going to transfer and disconnected the call       Was caller advised to seek appropriate level of care - yes/no:No, patient disconnected the call.        Message from [facility]

## 2020-12-18 NOTE — TELEPHONE ENCOUNTER
A prescription for Cipro was sent to her pharmacy. If symptoms persist she will need to come in for urinalysis and culture.

## 2020-12-21 ENCOUNTER — TELEPHONE (OUTPATIENT)
Dept: INTERNAL MEDICINE CLINIC | Age: 40
End: 2020-12-21

## 2020-12-21 NOTE — TELEPHONE ENCOUNTER
Identified patient 2 identifiers verified. Patient was left  Message to follow up on new prescription and to reschedule appointment today.

## 2020-12-21 NOTE — TELEPHONE ENCOUNTER
Left message for patient to contact this office to follow up on ABT that was prescribed on 12/18/20 by Dr. Halle Miller and to see if appointment was still needed.

## 2021-01-10 DIAGNOSIS — F32.A ANXIETY AND DEPRESSION: Primary | ICD-10-CM

## 2021-01-10 DIAGNOSIS — F41.9 ANXIETY AND DEPRESSION: Primary | ICD-10-CM

## 2021-01-10 RX ORDER — BUPROPION HYDROCHLORIDE 150 MG/1
150 TABLET ORAL
Qty: 30 TAB | Refills: 3 | Status: CANCELLED | OUTPATIENT
Start: 2021-01-10

## 2021-01-11 RX ORDER — BUPROPION HYDROCHLORIDE 300 MG/1
300 TABLET ORAL
Qty: 30 TAB | Refills: 4 | Status: SHIPPED | OUTPATIENT
Start: 2021-01-11 | End: 2021-09-23 | Stop reason: ALTCHOICE

## 2021-02-02 ENCOUNTER — HOSPITAL ENCOUNTER (OUTPATIENT)
Dept: MAMMOGRAPHY | Age: 41
Discharge: HOME | End: 2021-02-02
Attending: FAMILY MEDICINE
Payer: COMMERCIAL

## 2021-02-02 DIAGNOSIS — N64.4 BREAST PAIN: ICD-10-CM

## 2021-02-02 PROCEDURE — 77063 BREAST TOMOSYNTHESIS BI: CPT

## 2021-02-11 LAB
25(OH)D3+25(OH)D2 SERPL-MCNC: 16.7 NG/ML (ref 30–100)
ALBUMIN SERPL-MCNC: 4.3 G/DL (ref 3.8–4.8)
ALBUMIN/GLOB SERPL: 1.7 {RATIO} (ref 1.2–2.2)
ALP SERPL-CCNC: 74 IU/L (ref 39–117)
ALT SERPL-CCNC: 16 IU/L (ref 0–32)
AST SERPL-CCNC: 19 IU/L (ref 0–40)
BASOPHILS # BLD AUTO: 0 X10E3/UL (ref 0–0.2)
BASOPHILS NFR BLD AUTO: 1 %
BILIRUB SERPL-MCNC: 0.3 MG/DL (ref 0–1.2)
BUN SERPL-MCNC: 7 MG/DL (ref 6–24)
BUN/CREAT SERPL: 9 (ref 9–23)
CALCIUM SERPL-MCNC: 9.7 MG/DL (ref 8.7–10.2)
CHLORIDE SERPL-SCNC: 105 MMOL/L (ref 96–106)
CO2 SERPL-SCNC: 21 MMOL/L (ref 20–29)
CREAT SERPL-MCNC: 0.79 MG/DL (ref 0.57–1)
EOSINOPHIL # BLD AUTO: 0.2 X10E3/UL (ref 0–0.4)
EOSINOPHIL NFR BLD AUTO: 3 %
ERYTHROCYTE [DISTWIDTH] IN BLOOD BY AUTOMATED COUNT: 12.4 % (ref 11.7–15.4)
GLOBULIN SER CALC-MCNC: 2.6 G/DL (ref 1.5–4.5)
GLUCOSE SERPL-MCNC: 105 MG/DL (ref 65–99)
HCT VFR BLD AUTO: 37.6 % (ref 34–46.6)
HGB BLD-MCNC: 12.6 G/DL (ref 11.1–15.9)
IMM GRANULOCYTES # BLD AUTO: 0 X10E3/UL (ref 0–0.1)
IMM GRANULOCYTES NFR BLD AUTO: 0 %
LYMPHOCYTES # BLD AUTO: 2.8 X10E3/UL (ref 0.7–3.1)
LYMPHOCYTES NFR BLD AUTO: 34 %
MCH RBC QN AUTO: 31.8 PG (ref 26.6–33)
MCHC RBC AUTO-ENTMCNC: 33.5 G/DL (ref 31.5–35.7)
MCV RBC AUTO: 95 FL (ref 79–97)
MONOCYTES # BLD AUTO: 0.5 X10E3/UL (ref 0.1–0.9)
MONOCYTES NFR BLD AUTO: 6 %
NEUTROPHILS # BLD AUTO: 4.6 X10E3/UL (ref 1.4–7)
NEUTROPHILS NFR BLD AUTO: 56 %
PLATELET # BLD AUTO: 380 X10E3/UL (ref 150–450)
POTASSIUM SERPL-SCNC: 4.7 MMOL/L (ref 3.5–5.2)
PROT SERPL-MCNC: 6.9 G/DL (ref 6–8.5)
RBC # BLD AUTO: 3.96 X10E6/UL (ref 3.77–5.28)
SODIUM SERPL-SCNC: 139 MMOL/L (ref 134–144)
T4 FREE SERPL-MCNC: 1.05 NG/DL (ref 0.82–1.77)
TSH SERPL DL<=0.005 MIU/L-ACNC: 0.98 UIU/ML (ref 0.45–4.5)
WBC # BLD AUTO: 8.1 X10E3/UL (ref 3.4–10.8)

## 2021-02-12 NOTE — PROGRESS NOTES
CMP-Normal electrolyte levels except for an elevation in the glucose level, normal renal and liver function  Vit d-low  Please call in rx for drisdol 50,000 units 1 po q week #8. Repeat level in 2 months. Please mail lab order to the patient.   All other results are normal.

## 2021-02-16 ENCOUNTER — TELEPHONE (OUTPATIENT)
Dept: INTERNAL MEDICINE CLINIC | Age: 41
End: 2021-02-16

## 2021-02-16 DIAGNOSIS — E55.9 VITAMIN D DEFICIENCY: Primary | ICD-10-CM

## 2021-02-16 RX ORDER — ERGOCALCIFEROL 1.25 MG/1
50000 CAPSULE ORAL
Qty: 4 CAP | Refills: 2 | Status: SHIPPED | OUTPATIENT
Start: 2021-02-16 | End: 2021-05-05 | Stop reason: SDUPTHER

## 2021-02-16 NOTE — TELEPHONE ENCOUNTER
----- Message from Stacey Boo MD sent at 2/11/2021  7:26 PM EST -----  CMP-Normal electrolyte levels except for an elevation in the glucose level, normal renal and liver function  Vit d-low  Please call in rx for drisdol 50,000 units 1 po q week #8. Repeat level in 2 months. Please mail lab order to the patient.   All other results are normal.

## 2021-07-08 DIAGNOSIS — E55.9 VITAMIN D DEFICIENCY: ICD-10-CM

## 2021-07-13 RX ORDER — ERGOCALCIFEROL 1.25 MG/1
CAPSULE ORAL
Qty: 4 CAPSULE | Refills: 0 | OUTPATIENT
Start: 2021-07-13

## 2021-09-23 ENCOUNTER — VIRTUAL VISIT (OUTPATIENT)
Dept: INTERNAL MEDICINE CLINIC | Age: 41
End: 2021-09-23
Payer: COMMERCIAL

## 2021-09-23 DIAGNOSIS — K59.00 CONSTIPATION, UNSPECIFIED CONSTIPATION TYPE: Primary | ICD-10-CM

## 2021-09-23 PROCEDURE — 99213 OFFICE O/P EST LOW 20 MIN: CPT | Performed by: FAMILY MEDICINE

## 2021-09-23 RX ORDER — IBUPROFEN 800 MG/1
TABLET ORAL
COMMUNITY

## 2021-09-23 NOTE — PROGRESS NOTES
Carla Ayala is a 39 y.o. female who was seen by synchronous (real-time) audio-video technology on 9/23/2021 for Irritable Bowel Syndrome        Assessment & Plan:   Diagnoses and all orders for this visit:    1. Constipation, unspecified constipation type  -     linaCLOtide (Linzess) 145 mcg cap capsule; Take 1 Capsule by mouth Daily (before breakfast). 1. Constipation  This patient's constipation is secondary to chronic use of Norco and IBS. I suggested she try hydration, drinking some apple /prune juice, eating prunes, and see if that helps alleviate her constipation. I also advised her to try incorporating a probiotic into her diet to help with her gut keiry. Prescribed Linzess 145 mcg capsule. I updated the pt's medication list during today's visit. Subjective:     Patient presents today virtually for c/o irritable bowel syndrome. Constipation: She reports having significant constipation over the past year. Pt notes having to use an enema to use the restroom. She indicates having severe cramps similar to menstrual cramps but when she had a bowel movement there was mucus mixed with the stool. Pt remarks having difficulty in using the bathroom even when she is well hydrated and eating a high fiber diet. Pt reports discontinuing the Buspar and Wellbutrin d/t feeling on edge and having unexpected outbursts. Prior to Admission medications    Medication Sig Start Date End Date Taking? Authorizing Provider   ibuprofen (MOTRIN) 800 mg tablet ibuprofen 800 mg tablet   Yes Provider, Historical   linaCLOtide (Linzess) 145 mcg cap capsule Take 1 Capsule by mouth Daily (before breakfast). 9/23/21  Yes Sydney Willard MD   zolpidem (AMBIEN) 5 mg tablet Take 1 Tab by mouth nightly as needed for Sleep. Max Daily Amount: 5 mg. 5/9/21  Yes Sydney Willard MD   HYDROcodone-acetaminophen [facility]) 5-325 mg per tablet Take  by mouth.    Yes Provider, Historical   ergocalciferol (Drisdol) 1,250 mcg (50,000 unit) capsule Take 1 Cap by mouth every seven (7) days. Patient not taking: Reported on 2021  Shereen Isaacs MD   buPROPion XL (WELLBUTRIN XL) 300 mg XL tablet Take 1 Tab by mouth every morning. Patient not taking: Reported on 2021  Shereen Isaacs MD   ciprofloxacin HCl (CIPRO) 500 mg tablet Take 1 Tab by mouth two (2) times a day. 20  Shereen Isaacs MD   cyclobenzaprine (FLEXERIL) 10 mg tablet TK 1 T PO D FOR SPASM  Patient not taking: Reported on 2021  Provider, Historical   nystatin (MYCOSTATIN) powder Apply  to affected area four (4) times daily. 20  Shereen Isaacs MD   busPIRone (BUSPAR) 7.5 mg tablet Take 1 Tab by mouth three (3) times daily. Patient not taking: Reported on 20  Shereen Isaacs MD     Patient Active Problem List    Diagnosis Date Noted    H/O: hysterectomy 2020    Chronic low back pain without sciatica 2017    History of 2  sections 2016    Depression 2016     Current Outpatient Medications   Medication Sig Dispense Refill    ibuprofen (MOTRIN) 800 mg tablet ibuprofen 800 mg tablet      linaCLOtide (Linzess) 145 mcg cap capsule Take 1 Capsule by mouth Daily (before breakfast). 30 Capsule 2    zolpidem (AMBIEN) 5 mg tablet Take 1 Tab by mouth nightly as needed for Sleep. Max Daily Amount: 5 mg. 10 Tab 0    HYDROcodone-acetaminophen (NORCO) 5-325 mg per tablet Take  by mouth.        Allergies   Allergen Reactions    Food [Shellfish Containing Products] Rash    Pcn [Penicillins] Rash    Pork/Porcine Containing Products Rash    Zoloft [Sertraline] Other (comments)     Dysphoria, insomnia     Past Medical History:   Diagnosis Date    Chronic pain     low back    Fibroids, intramural 2016    Menopause     Menorrhagia with regular cycle 2016    Severe dysmenorrhea 2016    Uterine fibroid      Past Surgical History:   Procedure Laterality Date    HX  SECTION      HX  SECTION      HX HYSTERECTOMY      HX OTHER SURGICAL      WISDOM TEETH EXTRACTION    HX TUBAL LIGATION      [place] MYOMECTOMY 1-4,W/TOT 250GMS/<, [address]      fibroid removal by Dr. Klever Lara     Family History   Problem Relation Age of Onset    Diabetes Mother     Asthma Mother     Elevated Lipids Mother     Liver Disease Father         CHIRROSIS    Alcohol abuse Father     No Known Problems Son     No Known Problems Son     Other Sister         PEPTIC ULCER    Diabetes Maternal Aunt     Diabetes Maternal Grandmother     Other Other         NO ANESTHESIA PROBLEMS IN FAMILY    Anesth Problems Neg Hx      Social History     Tobacco Use    Smoking status: Former Smoker     Packs/day: 0.25     Years: 15.00     Pack years: 3.75     Types: Cigarettes     Quit date: 1/3/2017     Years since quittin.7    Smokeless tobacco: Current User    Tobacco comment: GIVEN \"STOP SMOKING\" HANDOUT. Substance Use Topics    Alcohol use: No     Alcohol/week: 0.0 standard drinks       Review of Systems   Constitutional: Negative. HENT: Negative. Eyes: Negative. Respiratory: Negative. Cardiovascular: Negative. Gastrointestinal: Positive for blood in stool (mucus in stool ) and constipation. Genitourinary: Negative. Musculoskeletal: Negative. Skin: Negative. Neurological: Negative. Endo/Heme/Allergies: Negative. Psychiatric/Behavioral: Negative. Objective:   No flowsheet data found.      [INSTRUCTIONS:  \"[x]\" Indicates a positive item  \"[]\" Indicates a negative item  -- DELETE ALL ITEMS NOT EXAMINED]    Constitutional: [x] Appears well-developed and well-nourished [x] No apparent distress      [] Abnormal -     Mental status: [x] Alert and awake  [x] Oriented to person/place/time [x] Able to follow commands    [] Abnormal -     Eyes:   EOM    [x]  Normal    [] Abnormal -   Sclera  [x] Normal    [] Abnormal -          Discharge [x]  None visible   [] Abnormal -     HENT: [x] Normocephalic, atraumatic  [] Abnormal -   [x] Mouth/Throat: Mucous membranes are moist    External Ears [x] Normal  [] Abnormal -    Neck: [x] No visualized mass [] Abnormal -     Pulmonary/Chest: [x] Respiratory effort normal   [x] No visualized signs of difficulty breathing or respiratory distress        [] Abnormal -      Musculoskeletal:   [x] Normal gait with no signs of ataxia         [x] Normal range of motion of neck        [] Abnormal -     Neurological:        [x] No Facial Asymmetry (Cranial nerve 7 motor function) (limited exam due to video visit)          [x] No gaze palsy        [] Abnormal -          Skin:        [x] No significant exanthematous lesions or discoloration noted on facial skin         [] Abnormal -            Psychiatric:       [x] Normal Affect [] Abnormal -        [x] No Hallucinations    Other pertinent observable physical exam findings:-        We discussed the expected course, resolution and complications of the diagnosis(es) in detail. Medication risks, benefits, costs, interactions, and alternatives were discussed as indicated. I advised her to contact the office if her condition worsens, changes or fails to improve as anticipated. She expressed understanding with the diagnosis(es) and plan. Trudy Kim, was evaluated through a synchronous (real-time) audio-video encounter. The patient (or guardian if applicable) is aware that this is a billable service. Verbal consent to proceed has been obtained within the past 12 months. The visit was conducted pursuant to the emergency declaration under the [address] [place] [address] and the [employer] and [employer] General Act. Patient identification was verified, and a caregiver was present when appropriate.  The patient was located in a state where the provider was credentialed to provide care.       Olga Camargo, as dictated by Tadeo Mcclellan MD.

## 2021-09-23 NOTE — PROGRESS NOTES
Kacey Grant is a 39 y.o. female  Chief Complaint   Patient presents with    Irritable Bowel Syndrome     1. Have you been to the ER, urgent care clinic since your last visit? Hospitalized since your last visit? No    2. Have you seen or consulted any other health care providers outside of the [address] [place] [address] since your last visit? Include any pap smears or colon screening.  No

## 2021-10-09 LAB — SARS-COV-2: NOT DETECTED

## 2021-11-15 DIAGNOSIS — F51.01 PRIMARY INSOMNIA: ICD-10-CM

## 2021-11-16 RX ORDER — ZOLPIDEM TARTRATE 5 MG/1
5 TABLET ORAL
Qty: 10 TABLET | Refills: 0 | Status: SHIPPED | OUTPATIENT
Start: 2021-11-16 | End: 2022-01-12

## 2021-11-16 NOTE — TELEPHONE ENCOUNTER
Future Appointments:  No future appointments. Last Appointment With Me:  9/23/2021     Requested Prescriptions     Pending Prescriptions Disp Refills    zolpidem (AMBIEN) 5 mg tablet 10 Tablet 0     Sig: Take 1 Tablet by mouth nightly as needed for Sleep. Max Daily Amount: 5 mg.

## 2021-12-30 DIAGNOSIS — K59.00 CONSTIPATION, UNSPECIFIED CONSTIPATION TYPE: ICD-10-CM

## 2022-01-02 RX ORDER — LINACLOTIDE 145 UG/1
CAPSULE, GELATIN COATED ORAL
Qty: 30 CAPSULE | Refills: 2 | Status: SHIPPED | OUTPATIENT
Start: 2022-01-02 | End: 2022-07-26

## 2022-01-06 ENCOUNTER — TELEPHONE (OUTPATIENT)
Dept: INTERNAL MEDICINE CLINIC | Age: 42
End: 2022-01-06

## 2022-01-06 ENCOUNTER — VIRTUAL VISIT (OUTPATIENT)
Dept: INTERNAL MEDICINE CLINIC | Age: 42
End: 2022-01-06
Payer: COMMERCIAL

## 2022-01-06 DIAGNOSIS — E55.9 VITAMIN D DEFICIENCY: Primary | ICD-10-CM

## 2022-01-06 DIAGNOSIS — E78.2 MIXED HYPERLIPIDEMIA: ICD-10-CM

## 2022-01-06 DIAGNOSIS — D72.829 LEUKOCYTOSIS, UNSPECIFIED TYPE: ICD-10-CM

## 2022-01-06 DIAGNOSIS — R73.01 ELEVATED FASTING GLUCOSE: ICD-10-CM

## 2022-01-06 DIAGNOSIS — F32.A ANXIETY AND DEPRESSION: ICD-10-CM

## 2022-01-06 DIAGNOSIS — Z12.31 ENCOUNTER FOR SCREENING MAMMOGRAM FOR MALIGNANT NEOPLASM OF BREAST: ICD-10-CM

## 2022-01-06 DIAGNOSIS — F41.9 ANXIETY AND DEPRESSION: ICD-10-CM

## 2022-01-06 DIAGNOSIS — R10.31 CHRONIC RLQ PAIN: ICD-10-CM

## 2022-01-06 DIAGNOSIS — G89.29 CHRONIC RLQ PAIN: ICD-10-CM

## 2022-01-06 PROCEDURE — 99214 OFFICE O/P EST MOD 30 MIN: CPT | Performed by: FAMILY MEDICINE

## 2022-01-06 RX ORDER — BUSPIRONE HYDROCHLORIDE 10 MG/1
10 TABLET ORAL 3 TIMES DAILY
Qty: 90 TABLET | Refills: 1 | Status: SHIPPED | OUTPATIENT
Start: 2022-01-06

## 2022-01-06 NOTE — PROGRESS NOTES
Kody Rodriguez is a 39 y.o. female who was seen by synchronous (real-time) audio-video technology on 1/6/2022 for Abdominal Pain (right side. c section giving her issues when she coughs. )        Assessment & Plan:   Diagnoses and all orders for this visit:    1. Vitamin D deficiency  -     VITAMIN D, 25 HYDROXY; Future    2. Anxiety and depression  -     busPIRone (BUSPAR) 10 mg tablet; Take 1 Tablet by mouth three (3) times daily. 3. Mixed hyperlipidemia  -     METABOLIC PANEL, COMPREHENSIVE; Future  -     LIPID PANEL; Future    4. Encounter for screening mammogram for malignant neoplasm of breast    5. Elevated fasting glucose  -     HEMOGLOBIN A1C WITH EAG; Future    6. Leukocytosis, unspecified type  -     CBC WITH AUTOMATED DIFF; Future    7. Chronic RLQ pain  -     US PELV NON OBS; Future  -     US ABD COMP; Future    Vitamin D Deficiency: I recommend a vitamin D test.    Anxiety/ Depression : I recommend and rx'd Buspar with titration instructions to help decrease anxiety before the start of her new job. I recommend continuing session with her therapist.     Abdominal Pain: I recommend a Pelvic and Abdominal US. I  After the US and test, I recommend an in-person follow up to examine her abdomen. Mammogram: I recommend a repeat screening mammogram.     Elevated fasting glucose: I recommend a A1C check. Leukocytosis: I recommend a CBC. Subjective:       Abdominal Pain: For the past 3 months she has constant abdominal pain, which is exacerbated by coughing that causes pain from the belly button to low right abdomen. She notes no distention noted during the cough. She takes Linzess, and does not experience diarrhea. She doesn't experience enhanced menstruation cycles, bladder pain, or abnormal stool. Of note, pt is s/p hysterectomy. She did have stinky urine from asparagus. She currently takes vitamin D due to deficiency. 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 She is unsure about obtaining a COVID vaccine. Elevated Glucose Levels: She had previously high glucose levels at a time she was fasting. Anxiety/ Depression: She has improved depression, but her anxiety has worsened. She was allergic to Zoloft. She has tried a 7.5mg of Buspar. She noted the Bupropion was causing her to be aggressive. She has a [employer] therapist that helps her out. Prior to Admission medications    Medication Sig Start Date End Date Taking? Authorizing Provider   busPIRone (BUSPAR) 10 mg tablet Take 1 Tablet by mouth three (3) times daily. 22  Yes Pradip Gannon MD   Linzess 145 mcg cap capsule TAKE 1 CAPSULE BY MOUTH DAILY BEFORE BREAKFAST 22  Yes Pradip Gannon MD   ibuprofen (MOTRIN) 800 mg tablet ibuprofen 800 mg tablet   Yes Provider, Historical   HYDROcodone-acetaminophen (NORCO) 5-325 mg per tablet Take  by mouth. Yes Provider, Historical   zolpidem (AMBIEN) 5 mg tablet Take 1 Tablet by mouth nightly as needed for Sleep. Max Daily Amount: 5 mg. Patient not taking: Reported on 21   Pradip Gannon MD     Patient Active Problem List    Diagnosis Date Noted    H/O: hysterectomy 2020    Chronic low back pain without sciatica 2017    History of 2  sections 2016    Depression 2016     Current Outpatient Medications   Medication Sig Dispense Refill    busPIRone (BUSPAR) 10 mg tablet Take 1 Tablet by mouth three (3) times daily. 90 Tablet 1    Linzess 145 mcg cap capsule TAKE 1 CAPSULE BY MOUTH DAILY BEFORE BREAKFAST 30 Capsule 2    ibuprofen (MOTRIN) 800 mg tablet ibuprofen 800 mg tablet      HYDROcodone-acetaminophen (NORCO) 5-325 mg per tablet Take  by mouth.  zolpidem (AMBIEN) 5 mg tablet Take 1 Tablet by mouth nightly as needed for Sleep. Max Daily Amount: 5 mg.  (Patient not taking: Reported on 2022) 10 Tablet 0     Allergies   Allergen Reactions    Food [Shellfish Containing Products] Rash    Pcn [Penicillins] Rash    Pork/Porcine Containing Products Rash    Zoloft [Sertraline] Other (comments)     Dysphoria, insomnia     Past Medical History:   Diagnosis Date    Chronic pain     low back    Fibroids, intramural 2016    Menopause     Menorrhagia with regular cycle 2016    Severe dysmenorrhea 2016    Uterine fibroid      Past Surgical History:   Procedure Laterality Date    HX  SECTION      HX  SECTION      HX HYSTERECTOMY      HX OTHER SURGICAL      WISDOM TEETH EXTRACTION    HX TUBAL LIGATION      [place] MYOMECTOMY 1-4,W/TOT 250GMS/<, [address]      fibroid removal by Dr. Agnieszka Velasquez     Family History   Problem Relation Age of Onset    Diabetes Mother     Asthma Mother     Elevated Lipids Mother     Liver Disease Father         CHIRROSIS    Alcohol abuse Father     No Known Problems Son     No Known Problems Son     Other Sister         PEPTIC ULCER    Diabetes Maternal Aunt     Diabetes Maternal Grandmother     Other Other         NO ANESTHESIA PROBLEMS IN FAMILY    Anesth Problems Neg Hx      Social History     Tobacco Use    Smoking status: Former Smoker     Packs/day: 0.25     Years: 15.00     Pack years: 3.75     Types: Cigarettes     Quit date: 1/3/2017     Years since quittin.0    Smokeless tobacco: Current User    Tobacco comment: GIVEN \"STOP SMOKING\" HANDOUT. Substance Use Topics    Alcohol use: No     Alcohol/week: 0.0 standard drinks        Review of Systems   Constitutional: Negative. HENT: Negative. Eyes: Negative. Respiratory: Negative. Cardiovascular: Negative. Gastrointestinal: Positive for abdominal pain. Genitourinary: Negative. Musculoskeletal: Negative. Skin: Negative. Neurological: Negative. Endo/Heme/Allergies: Negative. Psychiatric/Behavioral: Positive for depression. The patient is nervous/anxious. Objective:   No flowsheet data found.      [INSTRUCTIONS:  \"[x]\" Indicates a positive item  \"[]\" Indicates a negative item  -- DELETE ALL ITEMS NOT EXAMINED]    Constitutional: [x] Appears well-developed and well-nourished [x] No apparent distress      [] Abnormal -     Mental status: [x] Alert and awake  [x] Oriented to person/place/time [x] Able to follow commands    [] Abnormal -     Eyes:   EOM    [x]  Normal    [] Abnormal -   Sclera  [x]  Normal    [] Abnormal -          Discharge [x]  None visible   [] Abnormal -     HENT: [x] Normocephalic, atraumatic  [] Abnormal -   [x] Mouth/Throat: Mucous membranes are moist    External Ears [x] Normal  [] Abnormal -    Neck: [x] No visualized mass [] Abnormal -     Pulmonary/Chest: [x] Respiratory effort normal   [x] No visualized signs of difficulty breathing or respiratory distress        [] Abnormal -      Musculoskeletal:   [x] Normal gait with no signs of ataxia         [x] Normal range of motion of neck        [] Abnormal -     Neurological:        [x] No Facial Asymmetry (Cranial nerve 7 motor function) (limited exam due to video visit)          [x] No gaze palsy        [] Abnormal -          Skin:        [x] No significant exanthematous lesions or discoloration noted on facial skin         [] Abnormal -            Psychiatric:       [x] Normal Affect [] Abnormal -        [x] No Hallucinations    Other pertinent observable physical exam findings:-        We discussed the expected course, resolution and complications of the diagnosis(es) in detail. Medication risks, benefits, costs, interactions, and alternatives were discussed as indicated. I advised her to contact the office if her condition worsens, changes or fails to improve as anticipated. She expressed understanding with the diagnosis(es) and plan. Brent Mckeon, was evaluated through a synchronous (real-time) audio-video encounter. The patient (or guardian if applicable) is aware that this is a billable service.  Verbal consent to proceed has been obtained within the past 12 months. The visit was conducted pursuant to the emergency declaration under the [facility] [address] and the [employer] General Act. Patient identification was verified, and a caregiver was present when appropriate. The patient was located in a state where the provider was credentialed to provide care.       Susi Caldwell     Written by Lori Gamboa, as dictated by Dr. Nick Roche MD.

## 2022-01-06 NOTE — PROGRESS NOTES
Identified pt with two pt identifiers(name and ). Reviewed record in preparation for visit and have obtained necessary documentation. Chief Complaint   Patient presents with    Abdominal Pain     right side. c section giving her issues when she coughs. There were no vitals filed for this visit. Health Maintenance Due   Topic    Hepatitis C Screening     COVID-19 Vaccine (1)    Lipid Screen     Flu Vaccine (1)       Coordination of Care Questionnaire:  :   1) Have you been to an emergency room, urgent care, or hospitalized since your last visit? If yes, where when, and reason for visit? no       2. Have seen or consulted any other health care provider since your last visit? If yes, where when, and reason for visit? NO      An electronic signature was used to authenticate this note.   -- Cesar Guadarrama LPN

## 2022-01-10 DIAGNOSIS — F51.01 PRIMARY INSOMNIA: ICD-10-CM

## 2022-01-10 NOTE — PROGRESS NOTES
Raul Morton is a 39 y.o. female who was seen by synchronous (real-time) audio-video technology on 1/6/2022 for Abdominal Pain (right side. c section giving her issues when she coughs. )        Assessment & Plan:   Diagnoses and all orders for this visit:    1. Vitamin D deficiency  -     VITAMIN D, 25 HYDROXY; Future    2. Anxiety and depression  -     busPIRone (BUSPAR) 10 mg tablet; Take 1 Tablet by mouth three (3) times daily. 3. Mixed hyperlipidemia  -     METABOLIC PANEL, COMPREHENSIVE; Future  -     LIPID PANEL; Future    4. Encounter for screening mammogram for malignant neoplasm of breast    5. Elevated fasting glucose  -     HEMOGLOBIN A1C WITH EAG; Future    6. Leukocytosis, unspecified type  -     CBC WITH AUTOMATED DIFF; Future    7. Chronic RLQ pain  -     US PELV NON OBS; Future  -     US ABD COMP; Future    Vitamin D Deficiency: I recommend a vitamin D test.    Anxiety/ Depression : I recommend and rx'd Buspar with titration instructions to help decrease anxiety before the start of her new job. I recommend continuing session with her therapist.     Abdominal Pain: I recommend a Pelvic and Abdominal US. I  After the US and test, I recommend an in-person follow up to examine her abdomen. Mammogram: I recommend a repeat screening mammogram.     Elevated fasting glucose: I recommend a A1C check. Leukocytosis: I recommend a CBC. Subjective:       Abdominal Pain: For the past 3 months she has constant abdominal pain, which is exacerbated by coughing that causes pain from the belly button to low right abdomen. She notes no distention noted during the cough. She takes Linzess, and does not experience diarrhea. She doesn't experience enhanced menstruation cycles, bladder pain, or abnormal stool. Of note, pt is s/p hysterectomy. She did have stinky urine from asparagus. She currently takes vitamin D due to deficiency. 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 She is unsure about obtaining a COVID vaccine. Elevated Glucose Levels: She had previously high glucose levels at a time she was fasting. Anxiety/ Depression: She has improved depression, but her anxiety has worsened. She was allergic to Zoloft. She has tried a 7.5mg of Buspar. She noted the Bupropion was causing her to be aggressive. She has a Jehovah's witness therapist that helps her out. Prior to Admission medications    Medication Sig Start Date End Date Taking? Authorizing Provider   busPIRone (BUSPAR) 10 mg tablet Take 1 Tablet by mouth three (3) times daily. 22  Yes Orlin Corley MD   Linzess 145 mcg cap capsule TAKE 1 CAPSULE BY MOUTH DAILY BEFORE BREAKFAST 22  Yes Orlin Corley MD   ibuprofen (MOTRIN) 800 mg tablet ibuprofen 800 mg tablet   Yes Provider, Historical   HYDROcodone-acetaminophen (NORCO) 5-325 mg per tablet Take  by mouth. Yes Provider, Historical   zolpidem (AMBIEN) 5 mg tablet Take 1 Tablet by mouth nightly as needed for Sleep. Max Daily Amount: 5 mg. Patient not taking: Reported on 21   Orlin Corley MD     Patient Active Problem List    Diagnosis Date Noted    H/O: hysterectomy 2020    Chronic low back pain without sciatica 2017    History of 2  sections 2016    Depression 2016     Current Outpatient Medications   Medication Sig Dispense Refill    busPIRone (BUSPAR) 10 mg tablet Take 1 Tablet by mouth three (3) times daily. 90 Tablet 1    Linzess 145 mcg cap capsule TAKE 1 CAPSULE BY MOUTH DAILY BEFORE BREAKFAST 30 Capsule 2    ibuprofen (MOTRIN) 800 mg tablet ibuprofen 800 mg tablet      HYDROcodone-acetaminophen (NORCO) 5-325 mg per tablet Take  by mouth.  zolpidem (AMBIEN) 5 mg tablet Take 1 Tablet by mouth nightly as needed for Sleep. Max Daily Amount: 5 mg.  (Patient not taking: Reported on 2022) 10 Tablet 0     Allergies   Allergen Reactions    Food [Shellfish Containing Products] Rash    Pcn [Penicillins] Rash    Pork/Porcine Containing Products Rash    Zoloft [Sertraline] Other (comments)     Dysphoria, insomnia     Past Medical History:   Diagnosis Date    Chronic pain     low back    Fibroids, intramural 2016    Menopause     Menorrhagia with regular cycle 2016    Severe dysmenorrhea 2016    Uterine fibroid      Past Surgical History:   Procedure Laterality Date    HX  SECTION      HX  SECTION      HX HYSTERECTOMY      HX OTHER SURGICAL      WISDOM TEETH EXTRACTION    HX TUBAL LIGATION      NV MYOMECTOMY 1-4,W/TOT 250GMS/<, [address]      fibroid removal by Dr. Alok Gan     Family History   Problem Relation Age of Onset    Diabetes Mother     Asthma Mother     Elevated Lipids Mother     Liver Disease Father         CHIRROSIS    Alcohol abuse Father     No Known Problems Son     No Known Problems Son     Other Sister         PEPTIC ULCER    Diabetes Maternal Aunt     Diabetes Maternal Grandmother     Other Other         NO ANESTHESIA PROBLEMS IN FAMILY    Anesth Problems Neg Hx      Social History     Tobacco Use    Smoking status: Former Smoker     Packs/day: 0.25     Years: 15.00     Pack years: 3.75     Types: Cigarettes     Quit date: 1/3/2017     Years since quittin.0    Smokeless tobacco: Current User    Tobacco comment: GIVEN \"STOP SMOKING\" HANDOUT. Substance Use Topics    Alcohol use: No     Alcohol/week: 0.0 standard drinks        Review of Systems   Constitutional: Negative. HENT: Negative. Eyes: Negative. Respiratory: Negative. Cardiovascular: Negative. Gastrointestinal: Positive for abdominal pain. Genitourinary: Negative. Musculoskeletal: Negative. Skin: Negative. Neurological: Negative. Endo/Heme/Allergies: Negative. Psychiatric/Behavioral: Positive for depression. The patient is nervous/anxious. Objective:   No flowsheet data found.      [INSTRUCTIONS:  \"[x]\" Indicates a positive item  \"[]\" Indicates a negative item  -- DELETE ALL ITEMS NOT EXAMINED]    Constitutional: [x] Appears well-developed and well-nourished [x] No apparent distress      [] Abnormal -     Mental status: [x] Alert and awake  [x] Oriented to person/place/time [x] Able to follow commands    [] Abnormal -     Eyes:   EOM    [x]  Normal    [] Abnormal -   Sclera  [x]  Normal    [] Abnormal -          Discharge [x]  None visible   [] Abnormal -     HENT: [x] Normocephalic, atraumatic  [] Abnormal -   [x] Mouth/Throat: Mucous membranes are moist    External Ears [x] Normal  [] Abnormal -    Neck: [x] No visualized mass [] Abnormal -     Pulmonary/Chest: [x] Respiratory effort normal   [x] No visualized signs of difficulty breathing or respiratory distress        [] Abnormal -      Musculoskeletal:   [x] Normal gait with no signs of ataxia         [x] Normal range of motion of neck        [] Abnormal -     Neurological:        [x] No Facial Asymmetry (Cranial nerve 7 motor function) (limited exam due to video visit)          [x] No gaze palsy        [] Abnormal -          Skin:        [x] No significant exanthematous lesions or discoloration noted on facial skin         [] Abnormal -            Psychiatric:       [x] Normal Affect [] Abnormal -        [x] No Hallucinations    Other pertinent observable physical exam findings:-        We discussed the expected course, resolution and complications of the diagnosis(es) in detail. Medication risks, benefits, costs, interactions, and alternatives were discussed as indicated. I advised her to contact the office if her condition worsens, changes or fails to improve as anticipated. She expressed understanding with the diagnosis(es) and plan. Melquiades Guy, was evaluated through a synchronous (real-time) audio-video encounter. The patient (or guardian if applicable) is aware that this is a billable service.  Verbal consent to proceed has been obtained within the past 12 months. The visit was conducted pursuant to the emergency declaration under the [facility] [address] and the [email or web address] General Act. Patient identification was verified, and a caregiver was present when appropriate. The patient was located in a state where the provider was credentialed to provide care.       Edwina Mcmanus MD     Written by Janelle Gonsalves, as dictated by Dr. Edwina Mcmanus MD.

## 2022-01-11 ENCOUNTER — TRANSCRIBE ORDER (OUTPATIENT)
Dept: SCHEDULING | Age: 42
End: 2022-01-11

## 2022-01-11 DIAGNOSIS — R10.31 ABDOMINAL PAIN, RIGHT LOWER QUADRANT: Primary | ICD-10-CM

## 2022-01-11 DIAGNOSIS — G89.29 CHRONIC PAIN: ICD-10-CM

## 2022-01-11 DIAGNOSIS — Z12.31 SCREENING MAMMOGRAM FOR HIGH-RISK PATIENT: Primary | ICD-10-CM

## 2022-01-12 RX ORDER — ZOLPIDEM TARTRATE 5 MG/1
5 TABLET ORAL
Qty: 10 TABLET | Refills: 0 | Status: SHIPPED | OUTPATIENT
Start: 2022-01-12 | End: 2022-02-14 | Stop reason: SDUPTHER

## 2022-02-14 DIAGNOSIS — F51.01 PRIMARY INSOMNIA: ICD-10-CM

## 2022-02-15 NOTE — TELEPHONE ENCOUNTER
Future Appointments:  Future Appointments   Date Time Provider Nano Diane   2/22/2022 10:00 AM [facility] US 2 [facility]   2/22/2022 11:00 AM [facility] 2 [facility]   2/22/2022 11:30 AM [facility] US 1 [facility]   2/22/2022 12:00 PM [facility] US 1 [facility]        Last Appointment With Me:  1/6/2022     Requested Prescriptions     Pending Prescriptions Disp Refills    zolpidem (AMBIEN) 5 mg tablet 10 Tablet 0     Sig: Take 1 Tablet by mouth nightly as needed for Sleep. Max Daily Amount: 5 mg.

## 2022-02-16 RX ORDER — ZOLPIDEM TARTRATE 5 MG/1
5 TABLET ORAL
Qty: 10 TABLET | Refills: 0 | Status: SHIPPED | OUTPATIENT
Start: 2022-02-16 | End: 2022-03-14 | Stop reason: SDUPTHER

## 2022-02-22 ENCOUNTER — HOSPITAL ENCOUNTER (OUTPATIENT)
Dept: ULTRASOUND IMAGING | Age: 42
Discharge: HOME | End: 2022-02-22
Attending: FAMILY MEDICINE
Payer: COMMERCIAL

## 2022-02-22 ENCOUNTER — HOSPITAL ENCOUNTER (OUTPATIENT)
Dept: MAMMOGRAPHY | Age: 42
Discharge: HOME | End: 2022-02-22
Attending: FAMILY MEDICINE
Payer: COMMERCIAL

## 2022-02-22 DIAGNOSIS — R10.31 CHRONIC RLQ PAIN: ICD-10-CM

## 2022-02-22 DIAGNOSIS — R10.31 ABDOMINAL PAIN, RIGHT LOWER QUADRANT: ICD-10-CM

## 2022-02-22 DIAGNOSIS — G89.29 CHRONIC PAIN: ICD-10-CM

## 2022-02-22 DIAGNOSIS — G89.29 CHRONIC RLQ PAIN: ICD-10-CM

## 2022-02-22 DIAGNOSIS — Z12.31 SCREENING MAMMOGRAM FOR HIGH-RISK PATIENT: ICD-10-CM

## 2022-02-22 PROCEDURE — 76856 US EXAM PELVIC COMPLETE: CPT

## 2022-02-22 PROCEDURE — 76830 TRANSVAGINAL US NON-OB: CPT

## 2022-02-22 PROCEDURE — 77063 BREAST TOMOSYNTHESIS BI: CPT

## 2022-02-22 PROCEDURE — 76700 US EXAM ABDOM COMPLETE: CPT

## 2022-03-01 ENCOUNTER — PATIENT MESSAGE (OUTPATIENT)
Dept: INTERNAL MEDICINE CLINIC | Age: 42
End: 2022-03-01

## 2022-03-01 DIAGNOSIS — R79.89 LOW VITAMIN D LEVEL: Primary | ICD-10-CM

## 2022-03-01 RX ORDER — ERGOCALCIFEROL 1.25 MG/1
50000 CAPSULE ORAL
Qty: 8 CAPSULE | Refills: 0 | Status: SHIPPED | OUTPATIENT
Start: 2022-03-01 | End: 2022-04-03

## 2022-03-04 ENCOUNTER — TELEPHONE (OUTPATIENT)
Dept: INTERNAL MEDICINE CLINIC | Age: 42
End: 2022-03-04

## 2022-03-04 ENCOUNTER — PATIENT MESSAGE (OUTPATIENT)
Dept: INTERNAL MEDICINE CLINIC | Age: 42
End: 2022-03-04

## 2022-03-04 NOTE — TELEPHONE ENCOUNTER
----- Message from Petty Padilla MD sent at 3/3/2022  9:32 PM EST -----  Regarding: follow up  Please ask her if she is still having the RLQ pain.  I believe it is related to the ovarian cyst. If she is still having pain she will need to see GYN.  ----- Message -----  From: Javier Macias Results In  Sent: 2/22/2022  11:09 AM EST  To: Petty Padilla MD

## 2022-03-14 DIAGNOSIS — F51.01 PRIMARY INSOMNIA: ICD-10-CM

## 2022-03-15 NOTE — TELEPHONE ENCOUNTER
Future Appointments:  No future appointments. Last Appointment With Me:  1/6/2022     Requested Prescriptions     Pending Prescriptions Disp Refills    zolpidem (AMBIEN) 5 mg tablet 10 Tablet 0     Sig: Take 1 Tablet by mouth nightly as needed for Sleep. Max Daily Amount: 5 mg.

## 2022-03-16 RX ORDER — ZOLPIDEM TARTRATE 5 MG/1
5 TABLET ORAL
Qty: 10 TABLET | Refills: 0 | Status: SHIPPED | OUTPATIENT
Start: 2022-03-16 | End: 2022-04-07 | Stop reason: SDUPTHER

## 2022-03-19 PROBLEM — M54.50 CHRONIC LOW BACK PAIN WITHOUT SCIATICA: Status: ACTIVE | Noted: 2017-04-18

## 2022-03-19 PROBLEM — G89.29 CHRONIC LOW BACK PAIN WITHOUT SCIATICA: Status: ACTIVE | Noted: 2017-04-18

## 2022-03-19 PROBLEM — Z90.710 H/O: HYSTERECTOMY: Status: ACTIVE | Noted: 2020-03-30

## 2022-04-03 RX ORDER — ERGOCALCIFEROL 1.25 MG/1
CAPSULE ORAL
Qty: 8 CAPSULE | Refills: 0 | Status: SHIPPED | OUTPATIENT
Start: 2022-04-03 | End: 2022-05-07 | Stop reason: SDUPTHER

## 2022-04-07 DIAGNOSIS — F51.01 PRIMARY INSOMNIA: ICD-10-CM

## 2022-04-07 RX ORDER — ZOLPIDEM TARTRATE 5 MG/1
5 TABLET ORAL
Qty: 10 TABLET | Refills: 0 | Status: SHIPPED | OUTPATIENT
Start: 2022-04-07 | End: 2022-05-13

## 2022-04-12 LAB — SARS-COV-2, NAA: NEGATIVE

## 2022-05-07 DIAGNOSIS — F51.01 PRIMARY INSOMNIA: ICD-10-CM

## 2022-05-07 RX ORDER — ZOLPIDEM TARTRATE 5 MG/1
5 TABLET ORAL
Qty: 10 TABLET | Refills: 0 | Status: CANCELLED | OUTPATIENT
Start: 2022-05-07

## 2022-05-09 NOTE — TELEPHONE ENCOUNTER
Future Appointments:  No future appointments. Last Appointment With Me:  1/6/2022     Requested Prescriptions     Pending Prescriptions Disp Refills    ergocalciferol (ERGOCALCIFEROL) 1,250 mcg (50,000 unit) capsule 8 Capsule 0     Sig: Take 1 Capsule by mouth every seven (7) days.  zolpidem (AMBIEN) 5 mg tablet 10 Tablet 0     Sig: Take 1 Tablet by mouth nightly as needed for Sleep. Max Daily Amount: 5 mg.

## 2022-05-10 DIAGNOSIS — F51.01 PRIMARY INSOMNIA: ICD-10-CM

## 2022-05-11 NOTE — TELEPHONE ENCOUNTER
Future Appointments:  No future appointments. Last Appointment With Me:  1/6/2022     Requested Prescriptions     Pending Prescriptions Disp Refills    zolpidem (AMBIEN) 5 mg tablet 10 Tablet 0     Sig: Take 1 Tablet by mouth nightly as needed for Sleep. Max Daily Amount: 5 mg.
Refer to the Assessment tab to view/cancel completed assessment.

## 2022-05-11 NOTE — TELEPHONE ENCOUNTER
----- Message from Siobhan Trimble MD sent at 11/15/2017  9:07 AM EST -----  Please call the patient and let the patient know that her test result(s) is/are basically normal.  Thanks. Luis Felipe Rodriguez.
Identified patient 2 identifiers verified.  Patient aware labs were normal . Patient is concerned about hand pain, what is causing it
Patient returning call regarding results.  Best contact [phone]
The next step would be to see Neurology. Let's refer to Dr. Rashawn Agustin.  Reason: Neuropathy, peripheral.   BJF
DIZZINESS/HEADACHE/NAUSEA/VOMITING

## 2022-05-13 RX ORDER — ZOLPIDEM TARTRATE 5 MG/1
5 TABLET ORAL
Qty: 10 TABLET | Refills: 0 | Status: SHIPPED | OUTPATIENT
Start: 2022-05-13

## 2022-05-13 RX ORDER — ERGOCALCIFEROL 1.25 MG/1
50000 CAPSULE ORAL
Qty: 8 CAPSULE | Refills: 0 | Status: SHIPPED | OUTPATIENT
Start: 2022-05-13 | End: 2022-07-26

## 2022-07-26 DIAGNOSIS — K59.00 CONSTIPATION, UNSPECIFIED CONSTIPATION TYPE: ICD-10-CM

## 2022-07-26 RX ORDER — LINACLOTIDE 145 UG/1
CAPSULE, GELATIN COATED ORAL
Qty: 30 CAPSULE | Refills: 2 | Status: SHIPPED | OUTPATIENT
Start: 2022-07-26

## 2022-07-26 RX ORDER — ERGOCALCIFEROL 1.25 MG/1
CAPSULE ORAL
Qty: 8 CAPSULE | Refills: 0 | OUTPATIENT
Start: 2022-07-26

## 2022-07-26 RX ORDER — ERGOCALCIFEROL 1.25 MG/1
CAPSULE ORAL
Qty: 8 CAPSULE | Refills: 0 | Status: SHIPPED | OUTPATIENT
Start: 2022-07-26 | End: 2022-10-28 | Stop reason: SDUPTHER

## 2022-10-09 RX ORDER — ERGOCALCIFEROL 1.25 MG/1
CAPSULE ORAL
Qty: 8 CAPSULE | Refills: 0 | OUTPATIENT
Start: 2022-10-09

## 2022-10-28 NOTE — TELEPHONE ENCOUNTER
Requested Prescriptions     Pending Prescriptions Disp Refills    ergocalciferol (ERGOCALCIFEROL) 1,250 mcg (50,000 unit) capsule 8 Capsule 0     Sig: Take 1 Capsule by mouth every seven (7) days. No upcoming appointment.

## 2022-10-29 RX ORDER — ERGOCALCIFEROL 1.25 MG/1
50000 CAPSULE ORAL
Qty: 8 CAPSULE | Refills: 0 | Status: SHIPPED | OUTPATIENT
Start: 2022-10-29

## 2023-01-03 RX ORDER — ERGOCALCIFEROL 1.25 MG/1
CAPSULE ORAL
Qty: 8 CAPSULE | Refills: 0 | Status: SHIPPED | OUTPATIENT
Start: 2023-01-03

## 2024-02-20 ENCOUNTER — HOSPITAL ENCOUNTER (OUTPATIENT)
Facility: HOSPITAL | Age: 44
Discharge: HOME | End: 2024-02-23
Payer: COMMERCIAL

## 2024-02-20 VITALS — BODY MASS INDEX: 39.66 KG/M2 | WEIGHT: 202 LBS | HEIGHT: 60 IN

## 2024-02-20 DIAGNOSIS — Z12.39 SCREENING BREAST EXAMINATION: ICD-10-CM

## 2024-02-20 PROCEDURE — 77067 SCR MAMMO BI INCL CAD: CPT

## 2024-03-18 ENCOUNTER — TELEPHONE (OUTPATIENT)
Age: 44
End: 2024-03-18

## 2024-03-18 NOTE — TELEPHONE ENCOUNTER
Pt is ok with cancellation, but will need to be seen sooner than what I could schedule.   Pt states she needs to be seen right away for her stomach.

## 2024-04-01 ENCOUNTER — TELEPHONE (OUTPATIENT)
Age: 44
End: 2024-04-01

## 2024-04-03 RX ORDER — LINACLOTIDE 145 UG/1
CAPSULE, GELATIN COATED ORAL
Qty: 30 CAPSULE | Refills: 0 | Status: SHIPPED | OUTPATIENT
Start: 2024-04-03

## 2024-04-09 RX ORDER — LINACLOTIDE 145 UG/1
CAPSULE, GELATIN COATED ORAL
Qty: 30 CAPSULE | Refills: 0 | Status: SHIPPED | OUTPATIENT
Start: 2024-04-09

## 2024-12-09 NOTE — PROGRESS NOTES
1. Have you been to the ER, urgent care clinic since your last visit? Hospitalized since your last visit?no    2. Have you seen or consulted any other health care providers outside of the [address] [place] [address] since your last visit? Include any pap smears or colon screening.  no Discharged

## 2024-12-10 RX ORDER — LINACLOTIDE 145 UG/1
CAPSULE, GELATIN COATED ORAL
Qty: 30 CAPSULE | Refills: 0 | Status: SHIPPED | OUTPATIENT
Start: 2024-12-10

## 2025-02-27 ENCOUNTER — TELEPHONE (OUTPATIENT)
Age: 45
End: 2025-02-27

## 2025-03-05 NOTE — TELEPHONE ENCOUNTER
Received faxed refill request from pharmacy      PCP: Alondra Carrasquillo MD    Last appt: 4/10/2023  No future appointments.    Requested Prescriptions     Pending Prescriptions Disp Refills    linaclotide (LINZESS) 145 MCG capsule 30 capsule 0

## 2025-07-08 ENCOUNTER — TELEPHONE (OUTPATIENT)
Age: 45
End: 2025-07-08

## 2025-07-08 NOTE — TELEPHONE ENCOUNTER
Left a voice message for pt. To go to [facility] concerning her knee and reschedule physical for 7-9-25.

## 2025-07-09 ENCOUNTER — OFFICE VISIT (OUTPATIENT)
Age: 45
End: 2025-07-09
Payer: COMMERCIAL

## 2025-07-09 VITALS
TEMPERATURE: 98 F | HEIGHT: 60 IN | RESPIRATION RATE: 18 BRPM | HEART RATE: 100 BPM | SYSTOLIC BLOOD PRESSURE: 111 MMHG | WEIGHT: 175.8 LBS | DIASTOLIC BLOOD PRESSURE: 76 MMHG | OXYGEN SATURATION: 99 % | BODY MASS INDEX: 34.51 KG/M2

## 2025-07-09 DIAGNOSIS — R20.2 NUMBNESS AND TINGLING OF BOTH FEET: ICD-10-CM

## 2025-07-09 DIAGNOSIS — Z82.49 FAMILY HISTORY OF CORONARY ARTERY DISEASE IN FATHER: ICD-10-CM

## 2025-07-09 DIAGNOSIS — R53.82 CHRONIC FATIGUE: ICD-10-CM

## 2025-07-09 DIAGNOSIS — H53.8 BLURRY VISION, LEFT EYE: Primary | ICD-10-CM

## 2025-07-09 DIAGNOSIS — R20.0 NUMBNESS AND TINGLING OF BOTH FEET: ICD-10-CM

## 2025-07-09 DIAGNOSIS — Z12.11 COLON CANCER SCREENING: ICD-10-CM

## 2025-07-09 PROCEDURE — 99214 OFFICE O/P EST MOD 30 MIN: CPT | Performed by: FAMILY MEDICINE

## 2025-07-09 SDOH — ECONOMIC STABILITY: FOOD INSECURITY: WITHIN THE PAST 12 MONTHS, YOU WORRIED THAT YOUR FOOD WOULD RUN OUT BEFORE YOU GOT MONEY TO BUY MORE.: NEVER TRUE

## 2025-07-09 SDOH — ECONOMIC STABILITY: FOOD INSECURITY: WITHIN THE PAST 12 MONTHS, THE FOOD YOU BOUGHT JUST DIDN'T LAST AND YOU DIDN'T HAVE MONEY TO GET MORE.: NEVER TRUE

## 2025-07-09 ASSESSMENT — PATIENT HEALTH QUESTIONNAIRE - PHQ9
2. FEELING DOWN, DEPRESSED OR HOPELESS: MORE THAN HALF THE DAYS
SUM OF ALL RESPONSES TO PHQ QUESTIONS 1-9: 11
3. TROUBLE FALLING OR STAYING ASLEEP: NEARLY EVERY DAY
8. MOVING OR SPEAKING SO SLOWLY THAT OTHER PEOPLE COULD HAVE NOTICED. OR THE OPPOSITE, BEING SO FIGETY OR RESTLESS THAT YOU HAVE BEEN MOVING AROUND A LOT MORE THAN USUAL: NOT AT ALL
6. FEELING BAD ABOUT YOURSELF - OR THAT YOU ARE A FAILURE OR HAVE LET YOURSELF OR YOUR FAMILY DOWN: NOT AT ALL
SUM OF ALL RESPONSES TO PHQ QUESTIONS 1-9: 11
7. TROUBLE CONCENTRATING ON THINGS, SUCH AS READING THE NEWSPAPER OR WATCHING TELEVISION: NOT AT ALL
10. IF YOU CHECKED OFF ANY PROBLEMS, HOW DIFFICULT HAVE THESE PROBLEMS MADE IT FOR YOU TO DO YOUR WORK, TAKE CARE OF THINGS AT HOME, OR GET ALONG WITH OTHER PEOPLE: NOT DIFFICULT AT ALL
9. THOUGHTS THAT YOU WOULD BE BETTER OFF DEAD, OR OF HURTING YOURSELF: NOT AT ALL
4. FEELING TIRED OR HAVING LITTLE ENERGY: NEARLY EVERY DAY
SUM OF ALL RESPONSES TO PHQ QUESTIONS 1-9: 11
SUM OF ALL RESPONSES TO PHQ QUESTIONS 1-9: 11
5. POOR APPETITE OR OVEREATING: SEVERAL DAYS
1. LITTLE INTEREST OR PLEASURE IN DOING THINGS: MORE THAN HALF THE DAYS

## 2025-07-09 NOTE — PROGRESS NOTES
SUBJECTIVE:   Ms. Shamika Dey is a 45 y.o. female who is here for follow up of routine medical issues.      Patient presents in office today for blurred vision, muscle tingling, and requests lab work.      Pt is fasting today.    Pt expresses concern for vision. She went to eye doctor recently and thinks she is nearsighted. Pt describes her left eye feels as if its drooping. Pt is experiencing constant HA of only the R side.    Pt has been having mumbing and tingling sensation of BL legs.     Pt is feeling extremely fatigued. She reports she is in distress from her father passing away 2 months ago from cardiac arrest.    Pt has been doing routine aquatic exercises.    Ultrasound of the pelvic and abdomen done on 2022.    Pt specifically denies changes in vision or hearing, trouble with swallowing or taste, CP, SOB, heartburn or upset stomach, change in bowel habits, unusual joint or muscle pains, numbness or tingling in extremities, or skin lesions of concern.     At this time, she is otherwise doing well and has brought no other complaints to my attention today.  For a list of the medical issues addressed today, see the assessment and plan below.    PMH:   Past Medical History:   Diagnosis Date    Anxiety     Chronic back pain     Chronic pain     low back    Depression     Fibroids, intramural 2016    Heart murmur     Menopause     Menorrhagia with regular cycle 2016    Severe dysmenorrhea 2016    Uterine fibroid      PSH:  has a past surgical history that includes Hysterectomy;  section (); myomectomy 1-4,w/tot 250gms/<,abd apprch ();  section (); other surgical history; and Tubal ligation.    All: is allergic to sertraline, penicillins, and shellfish allergy.   MEDS:   Current Outpatient Medications   Medication Sig    HYDROcodone-acetaminophen (NORCO) 5-325 MG per tablet Take by mouth.     No current facility-administered medications for this visit.

## 2025-07-09 NOTE — PROGRESS NOTES
Have you been to the ER, urgent care clinic since your last visit?  Hospitalized since your last visit?   NO    Have you seen or consulted any other health care providers outside our system since your last visit?   [facility]      “Have you had a colorectal cancer screening such as a colonoscopy/FIT/Cologuard?    NO    No colonoscopy on file  No cologuard on file  No FIT/FOBT on file   No flexible sigmoidoscopy on file

## 2025-07-11 ENCOUNTER — TRANSCRIBE ORDERS (OUTPATIENT)
Facility: HOSPITAL | Age: 45
End: 2025-07-11

## 2025-07-11 DIAGNOSIS — M54.16 LUMBAR RADICULOPATHY: Primary | ICD-10-CM

## 2025-07-11 LAB
25(OH)D3 SERPL-MCNC: 25.3 NG/ML (ref 30–100)
ALBUMIN SERPL-MCNC: 4 G/DL (ref 3.5–5)
ALBUMIN/GLOB SERPL: 1.2 (ref 1.1–2.2)
ALP SERPL-CCNC: 72 U/L (ref 45–117)
ALT SERPL-CCNC: 23 U/L (ref 12–78)
ANION GAP SERPL CALC-SCNC: 5 MMOL/L (ref 2–12)
AST SERPL-CCNC: 16 U/L (ref 15–37)
BASOPHILS # BLD: 0.04 K/UL (ref 0–0.1)
BASOPHILS NFR BLD: 0.4 % (ref 0–1)
BILIRUB SERPL-MCNC: 0.3 MG/DL (ref 0.2–1)
BUN SERPL-MCNC: 14 MG/DL (ref 6–20)
BUN/CREAT SERPL: 17 (ref 12–20)
CALCIUM SERPL-MCNC: 9.3 MG/DL (ref 8.5–10.1)
CHLORIDE SERPL-SCNC: 107 MMOL/L (ref 97–108)
CHOLEST SERPL-MCNC: 162 MG/DL
CO2 SERPL-SCNC: 26 MMOL/L (ref 21–32)
CREAT SERPL-MCNC: 0.83 MG/DL (ref 0.55–1.02)
DIFFERENTIAL METHOD BLD: ABNORMAL
EOSINOPHIL # BLD: 0.07 K/UL (ref 0–0.4)
EOSINOPHIL NFR BLD: 0.7 % (ref 0–7)
ERYTHROCYTE [DISTWIDTH] IN BLOOD BY AUTOMATED COUNT: 12.4 % (ref 11.5–14.5)
FOLATE SERPL-MCNC: 12.1 NG/ML (ref 5–21)
GLOBULIN SER CALC-MCNC: 3.3 G/DL (ref 2–4)
GLUCOSE SERPL-MCNC: 94 MG/DL (ref 65–100)
HCT VFR BLD AUTO: 37.5 % (ref 35–47)
HDLC SERPL-MCNC: 62 MG/DL
HDLC SERPL: 2.6 (ref 0–5)
HGB BLD-MCNC: 11.9 G/DL (ref 11.5–16)
IMM GRANULOCYTES # BLD AUTO: 0.04 K/UL (ref 0–0.04)
IMM GRANULOCYTES NFR BLD AUTO: 0.4 % (ref 0–0.5)
LDLC SERPL CALC-MCNC: 76.4 MG/DL (ref 0–100)
LYMPHOCYTES # BLD: 2.65 K/UL (ref 0.8–3.5)
LYMPHOCYTES NFR BLD: 25.4 % (ref 12–49)
MCH RBC QN AUTO: 31.4 PG (ref 26–34)
MCHC RBC AUTO-ENTMCNC: 31.7 G/DL (ref 30–36.5)
MCV RBC AUTO: 98.9 FL (ref 80–99)
MONOCYTES # BLD: 0.62 K/UL (ref 0–1)
MONOCYTES NFR BLD: 5.9 % (ref 5–13)
NEUTS SEG # BLD: 7.03 K/UL (ref 1.8–8)
NEUTS SEG NFR BLD: 67.2 % (ref 32–75)
NRBC # BLD: 0 K/UL (ref 0–0.01)
NRBC BLD-RTO: 0 PER 100 WBC
PLATELET # BLD AUTO: 254 K/UL (ref 150–400)
PMV BLD AUTO: 12 FL (ref 8.9–12.9)
POTASSIUM SERPL-SCNC: 4.8 MMOL/L (ref 3.5–5.1)
PROT SERPL-MCNC: 7.3 G/DL (ref 6.4–8.2)
RBC # BLD AUTO: 3.79 M/UL (ref 3.8–5.2)
SODIUM SERPL-SCNC: 138 MMOL/L (ref 136–145)
T4 FREE SERPL-MCNC: 1 NG/DL (ref 0.8–1.5)
TRIGL SERPL-MCNC: 118 MG/DL
TSH SERPL DL<=0.05 MIU/L-ACNC: 1.25 UIU/ML (ref 0.36–3.74)
VIT B12 SERPL-MCNC: 679 PG/ML (ref 193–986)
VLDLC SERPL CALC-MCNC: 23.6 MG/DL
WBC # BLD AUTO: 10.5 K/UL (ref 3.6–11)

## 2025-07-17 ENCOUNTER — PATIENT MESSAGE (OUTPATIENT)
Age: 45
End: 2025-07-17

## 2025-07-25 ENCOUNTER — TELEPHONE (OUTPATIENT)
Age: 45
End: 2025-07-25

## 2025-07-25 NOTE — TELEPHONE ENCOUNTER
Pt states that she has sent a my chart message for Vit D and that has yet to be sent to pharm.    This is cheaper to get with script.    Please call into [facility] #[phone]

## 2025-07-29 RX ORDER — ERGOCALCIFEROL 1.25 MG/1
50000 CAPSULE, LIQUID FILLED ORAL WEEKLY
Qty: 8 CAPSULE | Refills: 0 | Status: SHIPPED | OUTPATIENT
Start: 2025-07-29

## 2025-07-30 ENCOUNTER — RESULTS FOLLOW-UP (OUTPATIENT)
Age: 45
End: 2025-07-30

## 2025-08-31 ENCOUNTER — HOSPITAL ENCOUNTER (OUTPATIENT)
Facility: HOSPITAL | Age: 45
Discharge: HOME | End: 2025-09-03
Payer: COMMERCIAL

## 2025-08-31 DIAGNOSIS — M54.16 LUMBAR RADICULOPATHY: ICD-10-CM

## 2025-08-31 PROCEDURE — 72148 MRI LUMBAR SPINE W/O DYE: CPT
